# Patient Record
Sex: FEMALE | Race: WHITE | NOT HISPANIC OR LATINO | Employment: OTHER | URBAN - METROPOLITAN AREA
[De-identification: names, ages, dates, MRNs, and addresses within clinical notes are randomized per-mention and may not be internally consistent; named-entity substitution may affect disease eponyms.]

---

## 2021-01-28 ENCOUNTER — HOSPITAL ENCOUNTER (OUTPATIENT)
Dept: OTHER | Facility: HOSPITAL | Age: 86
Discharge: HOME | End: 2021-01-28
Attending: INTERNAL MEDICINE

## 2021-01-28 LAB
ALBUMIN SERPL-MCNC: 3 G/DL (ref 3.5–5)
ALBUMIN/GLOB SERPL: 1.2 {RATIO} (ref 1.4–2.6)
ALP SERPL-CCNC: 117 U/L (ref 43–160)
ALT SERPL-CCNC: 18 U/L (ref 10–40)
ANION GAP SERPL CALC-SCNC: 14 MMOL/L (ref 8–19)
AST SERPL-CCNC: 36 U/L (ref 15–50)
BILIRUB SERPL-MCNC: 0.34 MG/DL (ref 0.2–1.3)
BUN SERPL-MCNC: 57 MG/DL (ref 5–25)
BUN/CREAT SERPL: 33 {RATIO} (ref 6–20)
CALCIUM SERPL-MCNC: 9.2 MG/DL (ref 8.7–10.4)
CHLORIDE SERPL-SCNC: 115 MMOL/L (ref 99–111)
CONV CO2: 27 MMOL/L (ref 22–32)
CONV TOTAL PROTEIN: 5.6 G/DL (ref 6.3–8.2)
CREAT UR-MCNC: 1.72 MG/DL (ref 0.5–0.9)
GFR SERPLBLD BASED ON 1.73 SQ M-ARVRAT: 27 ML/MIN/{1.73_M2}
GLOBULIN UR ELPH-MCNC: 2.6 G/DL (ref 2–3.5)
GLUCOSE SERPL-MCNC: 115 MG/DL (ref 65–99)
OSMOLALITY SERPL CALC.SUM OF ELEC: 333 MOSM/KG (ref 273–304)
POTASSIUM SERPL-SCNC: 3.3 MMOL/L (ref 3.5–5.3)
SODIUM SERPL-SCNC: 153 MMOL/L (ref 135–147)

## 2021-02-03 ENCOUNTER — HOSPITAL ENCOUNTER (OUTPATIENT)
Dept: OTHER | Facility: HOSPITAL | Age: 86
Discharge: HOME | End: 2021-02-03
Attending: INTERNAL MEDICINE

## 2021-02-03 LAB
APPEARANCE UR: ABNORMAL
BILIRUB UR QL: NEGATIVE
COLOR UR: YELLOW
CONV BACTERIA: ABNORMAL
CONV COLLECTION SOURCE (UA): ABNORMAL
CONV UROBILINOGEN IN URINE BY AUTOMATED TEST STRIP: 0.2 {EHRLICHU}/DL (ref 0.1–1)
CONV YEAST, UA: PRESENT
GLUCOSE UR QL: NEGATIVE MG/DL
HGB UR QL STRIP: ABNORMAL
KETONES UR QL STRIP: NEGATIVE MG/DL
LEUKOCYTE ESTERASE UR QL STRIP: ABNORMAL
NITRITE UR QL STRIP: NEGATIVE
PH UR STRIP.AUTO: 5 [PH] (ref 5–8)
PROT UR QL: NEGATIVE MG/DL
RBC #/AREA URNS HPF: ABNORMAL /[HPF]
SP GR UR: 1.01 (ref 1–1.03)
SQUAMOUS SPT QL MICRO: ABNORMAL /[HPF]
WBC #/AREA URNS HPF: ABNORMAL /[HPF]

## 2021-02-23 ENCOUNTER — HOSPITAL ENCOUNTER (OUTPATIENT)
Dept: VACCINE CLINIC | Facility: HOSPITAL | Age: 86
Discharge: HOME | End: 2021-02-23
Attending: INTERNAL MEDICINE

## 2021-02-25 ENCOUNTER — HOSPITAL ENCOUNTER (OUTPATIENT)
Dept: OTHER | Facility: HOSPITAL | Age: 86
Discharge: HOME | End: 2021-02-25
Attending: INTERNAL MEDICINE

## 2021-02-25 LAB
APPEARANCE UR: CLEAR
BILIRUB UR QL: NEGATIVE
COLOR UR: YELLOW
CONV COLLECTION SOURCE (UA): NORMAL
CONV UROBILINOGEN IN URINE BY AUTOMATED TEST STRIP: 1 {EHRLICHU}/DL (ref 0.1–1)
GLUCOSE UR QL: NEGATIVE MG/DL
HGB UR QL STRIP: NEGATIVE
KETONES UR QL STRIP: NEGATIVE MG/DL
LEUKOCYTE ESTERASE UR QL STRIP: NEGATIVE
NITRITE UR QL STRIP: NEGATIVE
PH UR STRIP.AUTO: 7 [PH] (ref 5–8)
PROT UR QL: NEGATIVE MG/DL
SP GR UR: 1.01 (ref 1–1.03)

## 2021-02-27 LAB
AMPICILLIN SUSC ISLT: <=2
BACTERIA UR CULT: ABNORMAL
CIPROFLOXACIN SUSC ISLT: >=8
CONV GENTAMICIN HIGH LEVEL SYNERGY: ABNORMAL
CONV STREPTOMYCIN HIGH LEVEL SYNERGY: ABNORMAL
DAPTOMYCIN SUSC ISLT: 2
DOXYCYCLINE SUSC ISLT: 8
ERYTHROMYCIN SUSC ISLT: 4
LEVOFLOXACIN SUSC ISLT: >=8
LINEZOLID SUSC ISLT: 2
NITROFURANTOIN SUSC ISLT: <=16
TETRACYCLINE SUSC ISLT: >=16
VANCOMYCIN SUSC ISLT: 1

## 2021-03-26 ENCOUNTER — HOSPITAL ENCOUNTER (OUTPATIENT)
Dept: VACCINE CLINIC | Facility: HOSPITAL | Age: 86
Discharge: HOME | End: 2021-03-26
Attending: INTERNAL MEDICINE

## 2021-06-06 ENCOUNTER — APPOINTMENT (OUTPATIENT)
Dept: CT IMAGING | Facility: HOSPITAL | Age: 86
End: 2021-06-06

## 2021-06-06 ENCOUNTER — APPOINTMENT (OUTPATIENT)
Dept: GENERAL RADIOLOGY | Facility: HOSPITAL | Age: 86
End: 2021-06-06

## 2021-06-06 ENCOUNTER — HOSPITAL ENCOUNTER (INPATIENT)
Facility: HOSPITAL | Age: 86
LOS: 12 days | Discharge: NURSING, REHAB OR LONG-TERM CARE | End: 2021-06-18
Attending: EMERGENCY MEDICINE | Admitting: HOSPITALIST

## 2021-06-06 DIAGNOSIS — J18.9 PNEUMONIA DUE TO INFECTIOUS ORGANISM, UNSPECIFIED LATERALITY, UNSPECIFIED PART OF LUNG: ICD-10-CM

## 2021-06-06 DIAGNOSIS — R91.8 LUNG MASS: ICD-10-CM

## 2021-06-06 DIAGNOSIS — R13.12 OROPHARYNGEAL DYSPHAGIA: ICD-10-CM

## 2021-06-06 DIAGNOSIS — Z78.9 DECREASED ACTIVITIES OF DAILY LIVING (ADL): ICD-10-CM

## 2021-06-06 DIAGNOSIS — Z91.89: Primary | ICD-10-CM

## 2021-06-06 DIAGNOSIS — R26.2 DIFFICULTY WALKING: ICD-10-CM

## 2021-06-06 DIAGNOSIS — E43 SEVERE MALNUTRITION (HCC): ICD-10-CM

## 2021-06-06 DIAGNOSIS — R09.02 HYPOXIA: ICD-10-CM

## 2021-06-06 PROBLEM — R06.02 SOB (SHORTNESS OF BREATH): Status: ACTIVE | Noted: 2021-06-06

## 2021-06-06 PROBLEM — I10 HTN (HYPERTENSION): Status: ACTIVE | Noted: 2021-06-06

## 2021-06-06 PROBLEM — Z86.711 HISTORY OF PULMONARY EMBOLISM: Status: ACTIVE | Noted: 2021-06-06

## 2021-06-06 LAB
ALBUMIN SERPL-MCNC: 3.9 G/DL (ref 3.5–5.2)
ALBUMIN/GLOB SERPL: 1.4 G/DL
ALP SERPL-CCNC: 73 U/L (ref 39–117)
ALT SERPL W P-5'-P-CCNC: 10 U/L (ref 1–33)
ANION GAP SERPL CALCULATED.3IONS-SCNC: 10.7 MMOL/L (ref 5–15)
AST SERPL-CCNC: 24 U/L (ref 1–32)
BASOPHILS # BLD AUTO: 0.07 10*3/MM3 (ref 0–0.2)
BASOPHILS NFR BLD AUTO: 1.1 % (ref 0–1.5)
BILIRUB SERPL-MCNC: 0.3 MG/DL (ref 0–1.2)
BUN SERPL-MCNC: 31 MG/DL (ref 8–23)
BUN/CREAT SERPL: 30.4 (ref 7–25)
CALCIUM SPEC-SCNC: 9.4 MG/DL (ref 8.6–10.5)
CHLORIDE SERPL-SCNC: 111 MMOL/L (ref 98–107)
CO2 SERPL-SCNC: 23.3 MMOL/L (ref 22–29)
CREAT SERPL-MCNC: 1.02 MG/DL (ref 0.57–1)
D-LACTATE SERPL-SCNC: 1.3 MMOL/L (ref 0.5–2)
DEPRECATED RDW RBC AUTO: 49 FL (ref 37–54)
EOSINOPHIL # BLD AUTO: 0.1 10*3/MM3 (ref 0–0.4)
EOSINOPHIL NFR BLD AUTO: 1.6 % (ref 0.3–6.2)
ERYTHROCYTE [DISTWIDTH] IN BLOOD BY AUTOMATED COUNT: 14.6 % (ref 12.3–15.4)
GFR SERPL CREATININE-BSD FRML MDRD: 52 ML/MIN/1.73
GLOBULIN UR ELPH-MCNC: 2.7 GM/DL
GLUCOSE SERPL-MCNC: 94 MG/DL (ref 65–99)
HCT VFR BLD AUTO: 36.2 % (ref 34–46.6)
HGB BLD-MCNC: 11 G/DL (ref 12–15.9)
HOLD SPECIMEN: NORMAL
HOLD SPECIMEN: NORMAL
IMM GRANULOCYTES # BLD AUTO: 0.01 10*3/MM3 (ref 0–0.05)
IMM GRANULOCYTES NFR BLD AUTO: 0.2 % (ref 0–0.5)
LYMPHOCYTES # BLD AUTO: 2.68 10*3/MM3 (ref 0.7–3.1)
LYMPHOCYTES NFR BLD AUTO: 41.7 % (ref 19.6–45.3)
MCH RBC QN AUTO: 27.8 PG (ref 26.6–33)
MCHC RBC AUTO-ENTMCNC: 30.4 G/DL (ref 31.5–35.7)
MCV RBC AUTO: 91.6 FL (ref 79–97)
MONOCYTES # BLD AUTO: 0.43 10*3/MM3 (ref 0.1–0.9)
MONOCYTES NFR BLD AUTO: 6.7 % (ref 5–12)
NEUTROPHILS NFR BLD AUTO: 3.13 10*3/MM3 (ref 1.7–7)
NEUTROPHILS NFR BLD AUTO: 48.7 % (ref 42.7–76)
NRBC BLD AUTO-RTO: 0 /100 WBC (ref 0–0.2)
NT-PROBNP SERPL-MCNC: 278.6 PG/ML (ref 0–1800)
PLATELET # BLD AUTO: 286 10*3/MM3 (ref 140–450)
PMV BLD AUTO: 9.8 FL (ref 6–12)
POTASSIUM SERPL-SCNC: 4.3 MMOL/L (ref 3.5–5.2)
PROT SERPL-MCNC: 6.6 G/DL (ref 6–8.5)
RBC # BLD AUTO: 3.95 10*6/MM3 (ref 3.77–5.28)
SODIUM SERPL-SCNC: 145 MMOL/L (ref 136–145)
TROPONIN T SERPL-MCNC: <0.01 NG/ML (ref 0–0.03)
WBC # BLD AUTO: 6.42 10*3/MM3 (ref 3.4–10.8)
WHOLE BLOOD HOLD SPECIMEN: NORMAL
WHOLE BLOOD HOLD SPECIMEN: NORMAL

## 2021-06-06 PROCEDURE — 82375 ASSAY CARBOXYHB QUANT: CPT | Performed by: EMERGENCY MEDICINE

## 2021-06-06 PROCEDURE — 99223 1ST HOSP IP/OBS HIGH 75: CPT | Performed by: HOSPITALIST

## 2021-06-06 PROCEDURE — 83050 HGB METHEMOGLOBIN QUAN: CPT | Performed by: EMERGENCY MEDICINE

## 2021-06-06 PROCEDURE — 84484 ASSAY OF TROPONIN QUANT: CPT | Performed by: EMERGENCY MEDICINE

## 2021-06-06 PROCEDURE — 83880 ASSAY OF NATRIURETIC PEPTIDE: CPT | Performed by: EMERGENCY MEDICINE

## 2021-06-06 PROCEDURE — 71045 X-RAY EXAM CHEST 1 VIEW: CPT

## 2021-06-06 PROCEDURE — 83605 ASSAY OF LACTIC ACID: CPT | Performed by: EMERGENCY MEDICINE

## 2021-06-06 PROCEDURE — 87040 BLOOD CULTURE FOR BACTERIA: CPT | Performed by: EMERGENCY MEDICINE

## 2021-06-06 PROCEDURE — 85025 COMPLETE CBC W/AUTO DIFF WBC: CPT | Performed by: EMERGENCY MEDICINE

## 2021-06-06 PROCEDURE — 80053 COMPREHEN METABOLIC PANEL: CPT | Performed by: EMERGENCY MEDICINE

## 2021-06-06 PROCEDURE — 71260 CT THORAX DX C+: CPT

## 2021-06-06 PROCEDURE — 25010000002 PIPERACILLIN SOD-TAZOBACTAM PER 1 G: Performed by: EMERGENCY MEDICINE

## 2021-06-06 PROCEDURE — 93005 ELECTROCARDIOGRAM TRACING: CPT | Performed by: EMERGENCY MEDICINE

## 2021-06-06 PROCEDURE — 0 IOPAMIDOL PER 1 ML: Performed by: EMERGENCY MEDICINE

## 2021-06-06 PROCEDURE — 36600 WITHDRAWAL OF ARTERIAL BLOOD: CPT | Performed by: EMERGENCY MEDICINE

## 2021-06-06 PROCEDURE — 82805 BLOOD GASES W/O2 SATURATION: CPT | Performed by: EMERGENCY MEDICINE

## 2021-06-06 PROCEDURE — 99285 EMERGENCY DEPT VISIT HI MDM: CPT

## 2021-06-06 RX ORDER — ACETAMINOPHEN 500 MG
1000 TABLET ORAL 3 TIMES DAILY
Status: DISPENSED | OUTPATIENT
Start: 2021-06-07 | End: 2021-06-08

## 2021-06-06 RX ORDER — POLYETHYLENE GLYCOL 3350 17 G/17G
17 POWDER, FOR SOLUTION ORAL DAILY PRN
Status: DISCONTINUED | OUTPATIENT
Start: 2021-06-06 | End: 2021-06-18 | Stop reason: HOSPADM

## 2021-06-06 RX ORDER — LOSARTAN POTASSIUM 100 MG/1
TABLET ORAL EVERY 24 HOURS
COMMUNITY

## 2021-06-06 RX ORDER — AMOXICILLIN 250 MG
2 CAPSULE ORAL 2 TIMES DAILY
Status: DISCONTINUED | OUTPATIENT
Start: 2021-06-07 | End: 2021-06-18 | Stop reason: HOSPADM

## 2021-06-06 RX ORDER — PREDNISONE 20 MG/1
40 TABLET ORAL
Status: COMPLETED | OUTPATIENT
Start: 2021-06-07 | End: 2021-06-11

## 2021-06-06 RX ORDER — GUAIFENESIN 600 MG/1
600 TABLET, EXTENDED RELEASE ORAL EVERY 12 HOURS SCHEDULED
Status: DISCONTINUED | OUTPATIENT
Start: 2021-06-07 | End: 2021-06-18 | Stop reason: HOSPADM

## 2021-06-06 RX ORDER — OXYCODONE HYDROCHLORIDE 5 MG/1
5 TABLET ORAL EVERY 4 HOURS PRN
Status: ACTIVE | OUTPATIENT
Start: 2021-06-06 | End: 2021-06-13

## 2021-06-06 RX ORDER — SODIUM CHLORIDE 0.9 % (FLUSH) 0.9 %
10 SYRINGE (ML) INJECTION AS NEEDED
Status: DISCONTINUED | OUTPATIENT
Start: 2021-06-06 | End: 2021-06-06

## 2021-06-06 RX ORDER — LOSARTAN POTASSIUM 50 MG/1
100 TABLET ORAL
Status: DISCONTINUED | OUTPATIENT
Start: 2021-06-07 | End: 2021-06-18 | Stop reason: HOSPADM

## 2021-06-06 RX ORDER — LIDOCAINE 50 MG/G
1 PATCH TOPICAL
Status: DISCONTINUED | OUTPATIENT
Start: 2021-06-07 | End: 2021-06-18 | Stop reason: HOSPADM

## 2021-06-06 RX ORDER — BUDESONIDE 0.5 MG/2ML
0.5 INHALANT ORAL
Status: DISCONTINUED | OUTPATIENT
Start: 2021-06-06 | End: 2021-06-18 | Stop reason: HOSPADM

## 2021-06-06 RX ORDER — IPRATROPIUM BROMIDE AND ALBUTEROL SULFATE 2.5; .5 MG/3ML; MG/3ML
3 SOLUTION RESPIRATORY (INHALATION)
Status: DISCONTINUED | OUTPATIENT
Start: 2021-06-07 | End: 2021-06-18 | Stop reason: HOSPADM

## 2021-06-06 RX ORDER — TROLAMINE SALICYLATE 10 G/100G
1 CREAM TOPICAL 4 TIMES DAILY PRN
Status: DISCONTINUED | OUTPATIENT
Start: 2021-06-06 | End: 2021-06-06

## 2021-06-06 RX ORDER — BISACODYL 10 MG
10 SUPPOSITORY, RECTAL RECTAL DAILY PRN
Status: DISCONTINUED | OUTPATIENT
Start: 2021-06-06 | End: 2021-06-18 | Stop reason: HOSPADM

## 2021-06-06 RX ORDER — SODIUM CHLORIDE 0.9 % (FLUSH) 0.9 %
10 SYRINGE (ML) INJECTION EVERY 12 HOURS SCHEDULED
Status: DISCONTINUED | OUTPATIENT
Start: 2021-06-07 | End: 2021-06-18 | Stop reason: HOSPADM

## 2021-06-06 RX ORDER — DRONABINOL 2.5 MG/1
5 CAPSULE ORAL 2 TIMES DAILY
Status: DISPENSED | OUTPATIENT
Start: 2021-06-07 | End: 2021-06-14

## 2021-06-06 RX ORDER — MEGESTROL ACETATE 40 MG/ML
20 SUSPENSION ORAL DAILY
COMMUNITY
End: 2021-06-18 | Stop reason: HOSPADM

## 2021-06-06 RX ORDER — CHOLECALCIFEROL (VITAMIN D3) 125 MCG
5 CAPSULE ORAL NIGHTLY PRN
Status: DISCONTINUED | OUTPATIENT
Start: 2021-06-06 | End: 2021-06-18 | Stop reason: HOSPADM

## 2021-06-06 RX ORDER — NICOTINE 21 MG/24HR
1 PATCH, TRANSDERMAL 24 HOURS TRANSDERMAL EVERY 24 HOURS
Status: DISCONTINUED | OUTPATIENT
Start: 2021-06-07 | End: 2021-06-07

## 2021-06-06 RX ORDER — FAMOTIDINE 20 MG/1
40 TABLET, FILM COATED ORAL DAILY
Status: DISCONTINUED | OUTPATIENT
Start: 2021-06-07 | End: 2021-06-15

## 2021-06-06 RX ORDER — SODIUM CHLORIDE 9 MG/ML
75 INJECTION, SOLUTION INTRAVENOUS CONTINUOUS
Status: ACTIVE | OUTPATIENT
Start: 2021-06-07 | End: 2021-06-07

## 2021-06-06 RX ORDER — FUROSEMIDE 40 MG/1
20 TABLET ORAL TAKE AS DIRECTED
Status: DISCONTINUED | OUTPATIENT
Start: 2021-06-06 | End: 2021-06-06

## 2021-06-06 RX ORDER — FUROSEMIDE 20 MG/1
20 TABLET ORAL TAKE AS DIRECTED
COMMUNITY
End: 2021-06-18 | Stop reason: HOSPADM

## 2021-06-06 RX ORDER — METOPROLOL SUCCINATE 25 MG/1
25 TABLET, EXTENDED RELEASE ORAL
Status: DISCONTINUED | OUTPATIENT
Start: 2021-06-07 | End: 2021-06-18 | Stop reason: HOSPADM

## 2021-06-06 RX ORDER — BISACODYL 5 MG/1
5 TABLET, DELAYED RELEASE ORAL DAILY PRN
Status: DISCONTINUED | OUTPATIENT
Start: 2021-06-06 | End: 2021-06-18 | Stop reason: HOSPADM

## 2021-06-06 RX ORDER — SODIUM CHLORIDE 0.9 % (FLUSH) 0.9 %
10 SYRINGE (ML) INJECTION AS NEEDED
Status: DISCONTINUED | OUTPATIENT
Start: 2021-06-06 | End: 2021-06-18 | Stop reason: HOSPADM

## 2021-06-06 RX ORDER — IPRATROPIUM BROMIDE AND ALBUTEROL SULFATE 2.5; .5 MG/3ML; MG/3ML
3 SOLUTION RESPIRATORY (INHALATION)
Status: DISCONTINUED | OUTPATIENT
Start: 2021-06-07 | End: 2021-06-06

## 2021-06-06 RX ORDER — NITROGLYCERIN 0.4 MG/1
0.4 TABLET SUBLINGUAL
Status: DISCONTINUED | OUTPATIENT
Start: 2021-06-06 | End: 2021-06-18 | Stop reason: HOSPADM

## 2021-06-06 RX ORDER — METOPROLOL SUCCINATE 25 MG/1
25 TABLET, EXTENDED RELEASE ORAL 2 TIMES DAILY
COMMUNITY

## 2021-06-06 RX ORDER — MORPHINE SULFATE 2 MG/ML
2 INJECTION, SOLUTION INTRAMUSCULAR; INTRAVENOUS
Status: ACTIVE | OUTPATIENT
Start: 2021-06-06 | End: 2021-06-13

## 2021-06-06 RX ORDER — ARFORMOTEROL TARTRATE 15 UG/2ML
15 SOLUTION RESPIRATORY (INHALATION)
Status: DISCONTINUED | OUTPATIENT
Start: 2021-06-06 | End: 2021-06-18 | Stop reason: HOSPADM

## 2021-06-06 RX ADMIN — TAZOBACTAM SODIUM AND PIPERACILLIN SODIUM 3.38 G: 375; 3 INJECTION, SOLUTION INTRAVENOUS at 19:38

## 2021-06-06 RX ADMIN — IOPAMIDOL 100 ML: 755 INJECTION, SOLUTION INTRAVENOUS at 18:14

## 2021-06-06 NOTE — ED PROVIDER NOTES
Subjective   Son is at the bedside.  He reports that she has had worsening shortness of breath for the last several days.  She has had a cough.  She has had no fever or chills.  The patient denies chest pain.  Patient has no nausea, vomiting or diaphoresis.  Patient denies leg pain and swelling.  Patient has no belly pain and has had no diarrhea.  The patient reports that she has had no leg swelling.      History provided by:  Caregiver and parent   used: No        Review of Systems   Constitutional: Positive for activity change and fatigue. Negative for appetite change, chills and diaphoresis.   HENT: Positive for congestion and sore throat. Negative for hearing loss, mouth sores, nosebleeds and sinus pain.    Eyes: Negative for pain, discharge and itching.   Respiratory: Positive for shortness of breath. Negative for apnea and chest tightness.    Cardiovascular: Negative for chest pain, palpitations and leg swelling.   Gastrointestinal: Negative for abdominal distention, abdominal pain, diarrhea and nausea.   Endocrine: Negative for cold intolerance and heat intolerance.   All other systems reviewed and are negative.      Past Medical History:   Diagnosis Date   • Cancer (CMS/HCC)    • DVT (deep venous thrombosis) (CMS/[facility])    • Hypertension        Allergies   Allergen Reactions   • Influenza Vaccines Anaphylaxis   • Megestrol Itching       Past Surgical History:   Procedure Laterality Date   • COLON SURGERY     • JOINT REPLACEMENT         History reviewed. No pertinent family history.    Social History     Socioeconomic History   • Marital status:      Spouse name: Not on file   • Number of children: Not on file   • Years of education: Not on file   • Highest education level: Not on file   Tobacco Use   • Smoking status: Current Every Day Smoker     Packs/day: 0.50   Vaping Use   • Vaping Use: Never used   Substance and Sexual Activity   • Alcohol use: Not Currently   • Drug use: Never    • Sexual activity: Not Currently           Objective   Physical Exam  Constitutional:       Appearance: She is well-developed and normal weight.   HENT:      Head: Normocephalic and atraumatic.   Eyes:      Extraocular Movements: Extraocular movements intact.      Pupils: Pupils are equal, round, and reactive to light.   Cardiovascular:      Rate and Rhythm: Normal rate.   Pulmonary:      Effort: Pulmonary effort is normal.      Breath sounds: Normal breath sounds.   Chest:      Chest wall: No mass, tenderness or edema. There is no dullness to percussion.   Abdominal:      General: Bowel sounds are normal.      Palpations: Abdomen is soft.   Musculoskeletal:         General: Normal range of motion.      Cervical back: Normal range of motion and neck supple.   Skin:     General: Skin is dry.      Capillary Refill: Capillary refill takes less than 2 seconds.   Neurological:      General: No focal deficit present.      Mental Status: She is alert.   Psychiatric:         Mood and Affect: Mood normal.         ECG 12 Lead      Date/Time: 6/6/2021 4:25 PM  Performed by: Jacob Galarza MD  Authorized by: Jacob Galarza MD   Interpreted by physician  Comparison: compared with previous ECG   Rhythm: sinus rhythm  Rate: normal  BPM: 65  QRS axis: normal  Conduction: conduction normal  ST Segments: ST segments normal  T Waves: T waves normal  Other: no other findings  Clinical impression: normal ECG                 ED Course           CT Chest With Contrast Diagnostic    Result Date: 6/6/2021  Narrative: PROCEDURE: CT CHEST W CONTRAST DIAGNOSTIC  COMPARISON:  [facility], CR, CXR PORTABLE, 1/13/2021, 18:31.  [facility], CR, CHEST AP/PA 1 VIEW, 1/12/2021, 18:45.  [facility], CT, CHEST W/ CONTRAST, 1/12/2007, 12:07.  [facility], CR, XR CHEST 1 VW, 6/06/2021, 14:44.  [facility], CT, ABDOMEN/PELVIS WITH CONTRAST, 7/31/2018, 8:46. INDICATIONS: shortness  of breath  PROTOCOL:   Pulmonary embolism imaging protocol performed    RADIATION:   DLP: 183.7 mGy*cm   Automated exposure control was utilized to minimize radiation dose. CONTRAST: 75 cc Isovue 370 I.V. LABS:   eGFR: >60 ml/min/1.73m2  TECHNIQUE: Axial images of the chest with intravenous contrast.  FINDINGS: There is a 2.3 cm spiculated nodule in the posterior superior right upper lobe.  No pulmonary emboli are identified.  Cardiomegaly is present.  There is coronary artery calcification.  There is a tiny right pleural effusion.  The thoracic aorta has a normal caliber.  Endobronchial debris/secretion is present in the left lower lobe and lingula.  There is mild airspace consolidation in the lower lobes.  There is no mediastinal, axillary or hilar adenopathy.  Stable 2.8 cm abdominal aortic aneurysm.  The bones are osteopenic.  There are multiple age-indeterminate compression fractures in the thoracic spine.  Emphysema is present in the lung fields.  IMPRESSION:  1. 2.3 cm spiculated nodule in the posterior superior right upper lobe suspicious for malignancy.  2. No pulmonary emboli are identified.  3. Endobronchial secretions/debris in the lingula and left lower lobe.  4. Airspace consolidation in the lower lobes is likely atelectasis.  Pneumonia is also possible.  5. Stable 2.8 cm abdominal aortic aneurysm.  6. Tiny right pleural effusion.   DENVER PAREDES MD       Electronically Signed and Approved By: DENVER PAREDES MD on 6/06/2021 at 18:27             XR Chest 1 View    Result Date: 6/6/2021  Narrative: PROCEDURE: XR CHEST 1 VW  COMPARISON: [facility], DEE DEE, CHEST AP/PA 1 VIEW, 12/14/2016, 23:54.  [facility], DEE DEE, CHEST AP/PA 1 VIEW, 9/07/2018, 18:20.  [facility], DEE DEE, CHEST AP/PA 1 VIEW, 10/21/2019, 7:58.  [facility], DEE DEE, CHEST AP/PA 1 VIEW, 1/12/2021, 18:45.  [facility], DEE DEE, CXR PORTABLE, 1/13/2021, 18:31.  INDICATIONS: SOB X 3 DAYS   FINDINGS:   The lungs are well-expanded. The heart and pulmonary vasculature are within normal limits. No pleural effusions are identified. There is a 1.8 cm opacity in the right apex.  Emphysematous changes are present.  The heart and pulmonary vasculature are within normal limits.  IMPRESSION:  1.8 cm opacity in the right apex possibly secondary to artifact.  Suggest a CT scan of the chest to evaluate for a pulmonary nodule given the smoking history.   DENVER PAREDES MD       Electronically Signed and Approved By: DENVER PAREDES MD on 6/06/2021 at 15:01                                             MDM  Number of Diagnoses or Management Options  Hypoxia  Lung mass  Pneumonia due to infectious organism, unspecified laterality, unspecified part of lung  Diagnosis management comments: The patient´s CBC was reviewed and shows no abnormalities of critical concern. Of note, there is no anemia requiring a blood transfusion and the platelet count is acceptable.  The patient´s CMP was reviewed and shows no abnormalities of critical concern. Of note, the patient´s sodium and potassium are acceptable. The patient´s liver enzymes are unremarkable. The patient´s renal function (creatinine) is preserved. The patient has a normal anion gap.  CT scan shows a spiculated nodule consistent consistent with malignancy.patient does have consolidations with possible pneumonia.  Patient was given Zosyn in the emergency department.  Patient also placed on a nonrebreather for hypoxia.  Case discussed with hospitalist who agrees with admission.    Total Critical Care time of 35 minutes. Total critical care time documented does not include time spent on separately billed procedures for services of nurses or physician assistants. I personally saw and examined the patient. I have reviewed all diagnostic interpretations and treatment plans as written. I was present for the key portions of any procedures performed and the inclusive time noted  in any critical care statement. Critical care time includes patient management by me, time spent at the patients bedside,  time to review lab and imaging results, discussing patient care, documentation in the medical record, and time spent with family or caregiver.       Amount and/or Complexity of Data Reviewed  Clinical lab tests: ordered and reviewed  Tests in the radiology section of CPT®: ordered and reviewed  Tests in the medicine section of CPT®: ordered and reviewed  Decide to obtain previous medical records or to obtain history from someone other than the patient: yes  Review and summarize past medical records: yes  Independent visualization of images, tracings, or specimens: yes    Risk of Complications, Morbidity, and/or Mortality  Presenting problems: high  Diagnostic procedures: high  Management options: high    Critical Care  Total time providing critical care: 30-74 minutes    Patient Progress  Patient progress: improved      Final diagnoses:   Pneumonia due to infectious organism, unspecified laterality, unspecified part of lung   Lung mass   Hypoxia       ED Disposition  ED Disposition     ED Disposition Condition Comment    Decision to Admit  Level of Care: Telemetry [5]   Diagnosis: Pneumonia due to infectious organism, unspecified laterality, unspecified part of lung [[id number]]   Admitting Physician: MAGDY BECK [[id number]]   Certification: I Certify That Inpatient Hospital Services Are Medically Necessary For Greater Than 2 Midnights            No follow-up provider specified.       Medication List      No changes were made to your prescriptions during this visit.          Jacob Galaraz MD  06/06/21 2021

## 2021-06-07 PROBLEM — J44.1 COPD WITH ACUTE EXACERBATION (HCC): Status: ACTIVE | Noted: 2021-06-07

## 2021-06-07 PROBLEM — R09.02 COPD WITH HYPOXIA (HCC): Status: ACTIVE | Noted: 2021-06-07

## 2021-06-07 PROBLEM — Z91.89: Status: ACTIVE | Noted: 2021-06-07

## 2021-06-07 PROBLEM — R06.02 SOB (SHORTNESS OF BREATH): Status: RESOLVED | Noted: 2021-06-06 | Resolved: 2021-06-07

## 2021-06-07 PROBLEM — Z86.711 HISTORY OF PULMONARY EMBOLISM: Status: RESOLVED | Noted: 2021-06-06 | Resolved: 2021-06-07

## 2021-06-07 PROBLEM — F17.200 NICOTINE DEPENDENCE: Status: ACTIVE | Noted: 2021-06-07

## 2021-06-07 PROBLEM — R89.9 ABNORMAL PLEURAL FLUID: Status: ACTIVE | Noted: 2021-06-07

## 2021-06-07 PROBLEM — J44.9 COPD WITH HYPOXIA (HCC): Status: ACTIVE | Noted: 2021-06-07

## 2021-06-07 PROBLEM — R91.8 MASS OF UPPER LOBE OF RIGHT LUNG: Status: ACTIVE | Noted: 2021-06-07

## 2021-06-07 LAB
ANION GAP SERPL CALCULATED.3IONS-SCNC: 10.6 MMOL/L (ref 5–15)
BASOPHILS # BLD AUTO: 0.06 10*3/MM3 (ref 0–0.2)
BASOPHILS NFR BLD AUTO: 0.9 % (ref 0–1.5)
BUN SERPL-MCNC: 27 MG/DL (ref 8–23)
BUN/CREAT SERPL: 27.3 (ref 7–25)
CALCIUM SPEC-SCNC: 9.1 MG/DL (ref 8.6–10.5)
CHLORIDE SERPL-SCNC: 110 MMOL/L (ref 98–107)
CO2 SERPL-SCNC: 19.4 MMOL/L (ref 22–29)
CREAT SERPL-MCNC: 0.99 MG/DL (ref 0.57–1)
DEPRECATED RDW RBC AUTO: 47.7 FL (ref 37–54)
EOSINOPHIL # BLD AUTO: 0.08 10*3/MM3 (ref 0–0.4)
EOSINOPHIL NFR BLD AUTO: 1.2 % (ref 0.3–6.2)
ERYTHROCYTE [DISTWIDTH] IN BLOOD BY AUTOMATED COUNT: 14.4 % (ref 12.3–15.4)
FERRITIN SERPL-MCNC: 15.78 NG/ML (ref 13–150)
FOLATE SERPL-MCNC: 19 NG/ML (ref 4.78–24.2)
GFR SERPL CREATININE-BSD FRML MDRD: 53 ML/MIN/1.73
GLUCOSE BLDC GLUCOMTR-MCNC: 98 MG/DL (ref 70–130)
GLUCOSE SERPL-MCNC: 81 MG/DL (ref 65–99)
HBA1C MFR BLD: 5.1 % (ref 4.8–5.6)
HCT VFR BLD AUTO: 33.4 % (ref 34–46.6)
HGB BLD-MCNC: 10 G/DL (ref 12–15.9)
IMM GRANULOCYTES # BLD AUTO: 0.01 10*3/MM3 (ref 0–0.05)
IMM GRANULOCYTES NFR BLD AUTO: 0.2 % (ref 0–0.5)
IRON 24H UR-MRATE: 33 MCG/DL (ref 37–145)
IRON SATN MFR SERPL: 10 % (ref 20–50)
LYMPHOCYTES # BLD AUTO: 2.09 10*3/MM3 (ref 0.7–3.1)
LYMPHOCYTES NFR BLD AUTO: 32.1 % (ref 19.6–45.3)
MAGNESIUM SERPL-MCNC: 1.9 MG/DL (ref 1.6–2.4)
MCH RBC QN AUTO: 27.1 PG (ref 26.6–33)
MCHC RBC AUTO-ENTMCNC: 29.9 G/DL (ref 31.5–35.7)
MCV RBC AUTO: 90.5 FL (ref 79–97)
MONOCYTES # BLD AUTO: 0.39 10*3/MM3 (ref 0.1–0.9)
MONOCYTES NFR BLD AUTO: 6 % (ref 5–12)
NEUTROPHILS NFR BLD AUTO: 3.89 10*3/MM3 (ref 1.7–7)
NEUTROPHILS NFR BLD AUTO: 59.6 % (ref 42.7–76)
NRBC BLD AUTO-RTO: 0 /100 WBC (ref 0–0.2)
PHOSPHATE SERPL-MCNC: 3.5 MG/DL (ref 2.5–4.5)
PLATELET # BLD AUTO: 223 10*3/MM3 (ref 140–450)
PMV BLD AUTO: 9.7 FL (ref 6–12)
POTASSIUM SERPL-SCNC: 3.7 MMOL/L (ref 3.5–5.2)
QT INTERVAL: 393 MS
RBC # BLD AUTO: 3.69 10*6/MM3 (ref 3.77–5.28)
RETICS # AUTO: 0.03 10*6/MM3 (ref 0.02–0.13)
RETICS/RBC NFR AUTO: 0.74 % (ref 0.7–1.9)
SODIUM SERPL-SCNC: 140 MMOL/L (ref 136–145)
T4 FREE SERPL-MCNC: 1.22 NG/DL (ref 0.93–1.7)
TIBC SERPL-MCNC: 347 MCG/DL (ref 298–536)
TRANSFERRIN SERPL-MCNC: 233 MG/DL (ref 200–360)
TSH SERPL DL<=0.05 MIU/L-ACNC: 1 UIU/ML (ref 0.27–4.2)
VIT B12 BLD-MCNC: 1687 PG/ML (ref 211–946)
WBC # BLD AUTO: 6.52 10*3/MM3 (ref 3.4–10.8)

## 2021-06-07 PROCEDURE — 83036 HEMOGLOBIN GLYCOSYLATED A1C: CPT | Performed by: HOSPITALIST

## 2021-06-07 PROCEDURE — 83735 ASSAY OF MAGNESIUM: CPT | Performed by: HOSPITALIST

## 2021-06-07 PROCEDURE — 85045 AUTOMATED RETICULOCYTE COUNT: CPT | Performed by: HOSPITALIST

## 2021-06-07 PROCEDURE — 94668 MNPJ CHEST WALL SBSQ: CPT

## 2021-06-07 PROCEDURE — 94660 CPAP INITIATION&MGMT: CPT

## 2021-06-07 PROCEDURE — 85025 COMPLETE CBC W/AUTO DIFF WBC: CPT | Performed by: HOSPITALIST

## 2021-06-07 PROCEDURE — 94799 UNLISTED PULMONARY SVC/PX: CPT

## 2021-06-07 PROCEDURE — 94760 N-INVAS EAR/PLS OXIMETRY 1: CPT

## 2021-06-07 PROCEDURE — 63710000001 PREDNISONE PER 1 MG: Performed by: HOSPITALIST

## 2021-06-07 PROCEDURE — 82746 ASSAY OF FOLIC ACID SERUM: CPT | Performed by: HOSPITALIST

## 2021-06-07 PROCEDURE — 84100 ASSAY OF PHOSPHORUS: CPT | Performed by: HOSPITALIST

## 2021-06-07 PROCEDURE — 63710000001 DRONABINOL PER 2.5 MG: Performed by: HOSPITALIST

## 2021-06-07 PROCEDURE — 84443 ASSAY THYROID STIM HORMONE: CPT | Performed by: HOSPITALIST

## 2021-06-07 PROCEDURE — 84466 ASSAY OF TRANSFERRIN: CPT | Performed by: HOSPITALIST

## 2021-06-07 PROCEDURE — 84439 ASSAY OF FREE THYROXINE: CPT | Performed by: HOSPITALIST

## 2021-06-07 PROCEDURE — 80048 BASIC METABOLIC PNL TOTAL CA: CPT | Performed by: HOSPITALIST

## 2021-06-07 PROCEDURE — 92610 EVALUATE SWALLOWING FUNCTION: CPT

## 2021-06-07 PROCEDURE — 94667 MNPJ CHEST WALL 1ST: CPT

## 2021-06-07 PROCEDURE — 94640 AIRWAY INHALATION TREATMENT: CPT

## 2021-06-07 PROCEDURE — 82728 ASSAY OF FERRITIN: CPT | Performed by: HOSPITALIST

## 2021-06-07 PROCEDURE — 82607 VITAMIN B-12: CPT | Performed by: HOSPITALIST

## 2021-06-07 PROCEDURE — 83540 ASSAY OF IRON: CPT | Performed by: HOSPITALIST

## 2021-06-07 PROCEDURE — 25010000002 CEFTRIAXONE PER 250 MG: Performed by: HOSPITALIST

## 2021-06-07 PROCEDURE — 82962 GLUCOSE BLOOD TEST: CPT

## 2021-06-07 RX ORDER — NICOTINE 21 MG/24HR
1 PATCH, TRANSDERMAL 24 HOURS TRANSDERMAL
Status: DISCONTINUED | OUTPATIENT
Start: 2021-06-07 | End: 2021-06-18 | Stop reason: HOSPADM

## 2021-06-07 RX ORDER — SODIUM CHLORIDE FOR INHALATION 3 %
VIAL, NEBULIZER (ML) INHALATION
Status: COMPLETED
Start: 2021-06-07 | End: 2021-06-07

## 2021-06-07 RX ADMIN — METOPROLOL SUCCINATE 25 MG: 25 TABLET, EXTENDED RELEASE ORAL at 08:36

## 2021-06-07 RX ADMIN — APIXABAN 2.5 MG: 2.5 TABLET, FILM COATED ORAL at 20:11

## 2021-06-07 RX ADMIN — DRONABINOL 5 MG: 2.5 CAPSULE ORAL at 20:11

## 2021-06-07 RX ADMIN — GUAIFENESIN 600 MG: 600 TABLET, EXTENDED RELEASE ORAL at 08:38

## 2021-06-07 RX ADMIN — GUAIFENESIN 600 MG: 600 TABLET, EXTENDED RELEASE ORAL at 20:11

## 2021-06-07 RX ADMIN — CEFTRIAXONE 1 G: 10 INJECTION, POWDER, FOR SOLUTION INTRAVENOUS at 00:41

## 2021-06-07 RX ADMIN — SODIUM CHLORIDE, PRESERVATIVE FREE 10 ML: 5 INJECTION INTRAVENOUS at 20:12

## 2021-06-07 RX ADMIN — APIXABAN 2.5 MG: 2.5 TABLET, FILM COATED ORAL at 00:40

## 2021-06-07 RX ADMIN — ACETAMINOPHEN 500 MG: 500 TABLET, FILM COATED ORAL at 00:38

## 2021-06-07 RX ADMIN — DOCUSATE SODIUM 50MG AND SENNOSIDES 8.6MG 2 TABLET: 8.6; 5 TABLET, FILM COATED ORAL at 20:11

## 2021-06-07 RX ADMIN — APIXABAN 2.5 MG: 2.5 TABLET, FILM COATED ORAL at 08:36

## 2021-06-07 RX ADMIN — ACETAMINOPHEN 1000 MG: 500 TABLET, FILM COATED ORAL at 20:11

## 2021-06-07 RX ADMIN — PREDNISONE 40 MG: 20 TABLET ORAL at 08:35

## 2021-06-07 RX ADMIN — SODIUM CHLORIDE, PRESERVATIVE FREE 10 ML: 5 INJECTION INTRAVENOUS at 00:43

## 2021-06-07 RX ADMIN — FAMOTIDINE 40 MG: 20 TABLET, FILM COATED ORAL at 08:35

## 2021-06-07 RX ADMIN — CEFTRIAXONE 1 G: 10 INJECTION, POWDER, FOR SOLUTION INTRAVENOUS at 20:11

## 2021-06-07 RX ADMIN — IPRATROPIUM BROMIDE AND ALBUTEROL SULFATE 3 ML: .5; 2.5 SOLUTION RESPIRATORY (INHALATION) at 01:40

## 2021-06-07 RX ADMIN — IPRATROPIUM BROMIDE AND ALBUTEROL SULFATE 3 ML: .5; 2.5 SOLUTION RESPIRATORY (INHALATION) at 07:46

## 2021-06-07 RX ADMIN — BUDESONIDE 0.5 MG: 0.5 INHALANT ORAL at 19:38

## 2021-06-07 RX ADMIN — LOSARTAN POTASSIUM 100 MG: 50 TABLET, FILM COATED ORAL at 08:37

## 2021-06-07 RX ADMIN — ARFORMOTEROL TARTRATE 15 MCG: 15 SOLUTION RESPIRATORY (INHALATION) at 19:38

## 2021-06-07 RX ADMIN — DOCUSATE SODIUM 50MG AND SENNOSIDES 8.6MG 2 TABLET: 8.6; 5 TABLET, FILM COATED ORAL at 08:36

## 2021-06-07 RX ADMIN — ARFORMOTEROL TARTRATE 15 MCG: 15 SOLUTION RESPIRATORY (INHALATION) at 07:45

## 2021-06-07 RX ADMIN — BUDESONIDE 0.5 MG: 0.5 INHALANT ORAL at 07:46

## 2021-06-07 RX ADMIN — SODIUM CHLORIDE, PRESERVATIVE FREE 10 ML: 5 INJECTION INTRAVENOUS at 08:42

## 2021-06-07 RX ADMIN — IPRATROPIUM BROMIDE AND ALBUTEROL SULFATE 3 ML: .5; 2.5 SOLUTION RESPIRATORY (INHALATION) at 19:37

## 2021-06-07 RX ADMIN — IPRATROPIUM BROMIDE AND ALBUTEROL SULFATE 3 ML: .5; 2.5 SOLUTION RESPIRATORY (INHALATION) at 12:05

## 2021-06-07 RX ADMIN — SODIUM CHLORIDE SOLN NEBU 3% 4 ML: 3 NEBU SOLN at 15:54

## 2021-06-07 RX ADMIN — DRONABINOL 5 MG: 2.5 CAPSULE ORAL at 08:38

## 2021-06-07 RX ADMIN — GUAIFENESIN 600 MG: 600 TABLET, EXTENDED RELEASE ORAL at 00:41

## 2021-06-07 RX ADMIN — SODIUM CHLORIDE 75 ML/HR: 9 INJECTION, SOLUTION INTRAVENOUS at 01:32

## 2021-06-07 NOTE — H&P
[facility]   HISTORY AND PHYSICAL    Patient Name: Elsa Terry  : 1935  MRN: [id number]  Primary Care Physician:  Joaquin Oliveira MD  Date of admission: 2021    Subjective  Short of breath  Subjective     Chief Complaint: Short of breath    Shortness of Breath  Associated symptoms include wheezing. Pertinent negatives include no fever.   Patient is an 85-year-old female who has had progressive shortness of breath for the last 7 days and productive cough.    Patient smokes 1 cigarette a day.  She denies any nausea, vomiting, and  fevers.    Chest CT shows: 2.3 cm spiculated nodule in the posterior superior right upper lobe suspicious for malignancy.Endobronchial secretions/debris in the lingula and left lower lobe. Pneumonia is also possible.Tiny right pleural effusion.  Stable 2.8 cm abdominal aortic aneurysm.    Patient's past medical history is significant for: Anemia for which she follows with me, COPD, hypertension, and history of multiple  PEs on oral anticoagulation.  She has a history of Colon Cancer resected in the past.  She has severe Kyphosis.      Review of Systems   Constitutional: Positive for appetite change and fatigue. Negative for activity change, chills, diaphoresis and fever.   HENT: Negative.    Eyes: Negative.    Respiratory: Positive for cough, shortness of breath and wheezing.    Cardiovascular: Negative.    Gastrointestinal: Negative.    Endocrine: Negative.    Genitourinary: Negative.    Musculoskeletal: Negative.    Skin: Negative.    Allergic/Immunologic: Negative.    Neurological: Negative.    Hematological: Negative.    Psychiatric/Behavioral: Negative.         Personal History     Past Medical History:   Diagnosis Date   • Cancer (CMS/HCC)     Colon Cancer approx    • DVT (deep venous thrombosis) (CMS/HCC)    • Elevated cholesterol    • Hypertension        Past Surgical History:   Procedure Laterality Date   • BREAST SURGERY      Lumpectomy   • COLON SURGERY      • JOINT REPLACEMENT      Right Hip ORIF approx 2010       Family History: family history is not on file. Otherwise pertinent FHx was reviewed and not pertinent to current issue.    Social History:  reports that she has been smoking cigarettes. She has been smoking about 0.25 packs per day. She has never used smokeless tobacco. She reports previous alcohol use. She reports that she does not use drugs.    Home Medications:  apixaban, furosemide, losartan, megestrol, and metoprolol succinate XL    Allergies:  Allergies   Allergen Reactions   • Influenza Vaccines Anaphylaxis   • Megestrol Itching       Objective    Objective     Vitals:   Temp:  [97.3 °F (36.3 °C)-98.6 °F (37 °C)] 97.4 °F (36.3 °C)  Heart Rate:  [60-77] 77  Resp:  [17-33] 20  BP: (121-188)/(52-81) 157/70  Flow (L/min):  [4-15] 15    Physical Exam   She is thin and Cachectic.  She has severe Kyphosis. She is alert and talking today. She is using her Oxygen NRB now.Decrease Breath sounds bilaterally.Abdomen soft,no masses. Cns.Moving all limbs. Ext.1 plus edema.    Result Review    Result Review:  I have personally reviewed the results from the time of this admission to 6/7/2021 10:35 EDT and agree with these findings:  [x]  Laboratory  []  Microbiology  [x]  Radiology  []  EKG/Telemetry   []  Cardiology/Vascular   []  Pathology  []  Old records  []  Other:  Most notable findings include: Spiculated nodule on lung CT.  Assessment/Plan   Assessment / Plan     Brief Patient Summary:  Elsa Terry is a 85 y.o. female who presents with worsening shortness of breath for 1 week.     Active Hospital Problems:  Active Hospital Problems    Diagnosis    • Pneumonia due to infectious organism    • SOB (shortness of breath)    • HTN (hypertension)    • History of pulmonary embolism      Plan:     #1 Acute hypoxic respiratory failure secondary to #2, 3, 4  -patient not on home O2.      #2 Possible left lower lobe pneumonia  -Started on Rocephin and azithromycin.   Low threshold to stop.    #3 COPD exacerbation  --DuoNeb, Brovana, and Pulmicort.  -Prednisone 40 mg for 5 days  -Mucinex.   -Aggressive pulmonary toilet    #4 2.3 cm spiculated nodule in the posterior superior right upper lobe suspicious   -Suspicious for malignancy  -Patient will need biopsy  -She is on Eliquis which will need to be held for biopsy  -Pulmonology consulted for further plan.     #5 History of recurrent PE  -On 2.5 mg of Eliquis. Adjusted for age and BMI.     #6  Severe protein calorie malnutrition and low body weight BMI 16.8  -Nutritional consult  -Dietary supplements    #7 Poor oral intake  -Start drobinol  -Steroid may also help augment her appetite  -Gentle hydration.  Will need to resume Lasix later during the hospital course.  -check thyroid studies    #8 GERD  -Continue Pepcid    #9 Hypertension  -Continue losartan and metoprolol    #10 Anemia (improved from last admission but still low)  -Iron panel, B12, folic acid.   -reports no dark stool.     #11 Patient had debris in lungs  -could be silently aspirating.Speech consulted.       DVT prophylaxis: On Eliquis adjusted for age and BMI  Medical DVT prophylaxis orders are present.    CODE STATUS:    Level Of Support Discussed With: Patient  Code Status: CPR  Medical Interventions (Level of Support Prior to Arrest): Full    Admission Status:  I believe this patient meets inaptient status.

## 2021-06-07 NOTE — CONSULTS
[facility]   Consult Note    Patient Name: Elsa Terry  : 1935  MRN: [id number]  Primary Care Physician: Joaquin Oliveira MD  Referring Physician: No ref. provider found  Date of admission: 2021    Subjective   Subjective     Reason for Consult/ Chief Complaint: Shortness of breath poor oxygen saturation poor appetite and loss of weight previous history of colon cancer history of smoking    HPI:  Elsa Terry is a 85 y.o. female .   C/O SOB, cough productive and on and off wheezing  There is weight loss cannot quantitate  There is on and off runny nose, sore throat, fever of heart burn.  There is no chest pain or hemoptysis.  There is no edema, clubbing or cyanosis.  Patient sleeps reclined and can not  take care of activities.  Watched at home by her     Occupation:  at a bank  Immunization flu known, pneumonia known,  PPD test status: Normal  Never been actively treated for tuberculosis or histoplasmosis  Does not use oxygen or CPAP at home  Intubated for Operative procedures  Pets: None  Travel history  Last PFT      Review of Systems  General: Denies any fever feels fatigue weight loss which she cannot quantitate  HEENT: Bilateral lens implants no nasal congestion no epistaxis  Respiratory system: Scoliotic spine with good air entry bilaterally with decreased sounds in the bases wheezing on and off  Cardiovascular system: Regular rhythm no chest pain or palpitations noted  Gastrointestinal system: No abdominal pain nausea vomiting or constipation at this time  Genitourinary system: Denies dysuria hesitancy  Musculoskeletal system: Generalized weakness bilaterally  Endocrine: Easily fatigues  Hematology: No bleeding bruising or swollen glands  Psychiatric: History of anxiety  Neurologic: Easily arousable and can communicate with full sentences generalized weakness bilaterally  Skin: No edema or rash noted       Personal History     Past Medical History:   Diagnosis Date    • Abnormal pleural fluid    • Colon cancer (CMS/HCC)    • COPD (chronic obstructive pulmonary disease) (CMS/HCC)    • DVT (deep venous thrombosis) (CMS/HCC)    • Elevated cholesterol    • Hypertension    • Mass of upper lobe of right lung    • Osteoporosis    • Pneumonia        Past Surgical History:   Procedure Laterality Date   • BREAST SURGERY      Lumpectomy   • CATARACT EXTRACTION     • COLON SURGERY     • HERNIA REPAIR     • HIP ARTHROPLASTY     • JOINT REPLACEMENT      Right Hip ORIF approx 2010   • TONSILLECTOMY     • TUBAL ABDOMINAL LIGATION         Family History: family history includes Cancer in her sister; Heart disease in her father and mother. Otherwise pertinent FHx was reviewed and not pertinent to current issue.    Social History:  reports that she has been smoking cigarettes. She started smoking about 67 years ago. She has been smoking about 0.25 packs per day. She has never used smokeless tobacco. She reports previous alcohol use. She reports that she does not use drugs.    Home Medications:  apixaban, furosemide, losartan, megestrol, and metoprolol succinate XL      Allergies:  Allergies   Allergen Reactions   • Influenza Vaccines Anaphylaxis   • Megestrol Itching       Objective    Objective   Vitals:  Temp:  [97.3 °F (36.3 °C)-98.6 °F (37 °C)] 97.8 °F (36.6 °C)  Heart Rate:  [60-77] 64  Resp:  [17-33] 29  BP: (121-188)/(52-81) 125/58  Flow (L/min):  [4-15] 15    Physical Exam:   Constitutional: Awake, alert and oriented   Eyes: PERRLA, sclerae anicteric, no conjunctival injection lens implants   HENT: NCAT, mucous membranes moist   Neck: Supple, no thyromegaly, no lymphadenopathy, trachea midline   Respiratory: Clear to auscultation bilaterally, nonlabored respirations    Cardiovascular: RRR, no murmurs, rubs, or gallops, palpable pedal pulses bilaterally   Gastrointestinal: Positive bowel sounds, soft, nontender, nondistended   Musculoskeletal: No bilateral ankle edema, no clubbing or  cyanosis to extremities   Psychiatric: Appropriate affect, cooperative   Neurologic: Oriented x 3, strength symmetric in all extremities, Cranial Nerves grossly intact to confrontation, speech clear   Skin: No rashes         Result Review    Result Review:  I have personally reviewed the results from the time of this admission to 06/07/21 12:34 PM EDT and agree with these findings:  [x]  Laboratory  []  Microbiology  [x]  Radiology  []  EKG/Telemetry   []  Cardiology/Vascular   []  Pathology  [x]  Old records  []  Other:  Most notable findings include: 6/6/2021 pH 7.39, PCO2 37 mmHg PaO2 86 mmHg 100% nonrebreathing mask  Chest x-ray 6/6/2021 shows 1.8 cm opacity in the right apex  6/6/2021 CT chest shows 2.3 cm spiculated mass in the right upper lobe with enlarged heart with left lower lobe and right lower lobe atelectasis versus airspace disease and small right pleural effusion of unknown significance    Assessment/Plan   Assessment / Plan     Brief Patient Summary:  Elsa Terry is a 85 y.o. female who admitted on 6 6 for evaluation of right upper lobe spiculated mass and a COPD exacerbation    Active Hospital Problems:  Active Hospital Problems    Diagnosis    • **COPD with hypoxia (CMS/HCC)    • Mass of upper lobe of right lung    • Abnormal pleural fluid      Small right pleural fluid     • Nicotine dependence    • Impaired attitude towards nutritional status    • Pneumonia due to infectious organism    • HTN (hypertension)        Plan:   Active Hospital Problems    Diagnosis    • **COPD with hypoxia (CMS/HCC): Continue duo nebs Brovana and Pulmicort  Continue oxygen supplement 2 to 4 L to maintain saturation greater than 90%  Started on BiPAP 15/5 rate of 14 FiO2 50%  Titrate FiO2 to lowest to maintain saturation greater than 90%    • Mass of upper lobe of right lung: We will consider diagnostic procedure when stable      • Abnormal pleural fluid: Scanty small right pleural fluid will tap when symptomatic  currently no active intervention at this time        Small right pleural fluid     • Nicotine dependence: Continue Habitrol 21 mg patch daily    • Impaired attitude towards nutritional status: Encourage nutrition continue Marinol orally    • Pneumonia due to infectious organism: Sputum for Gram stain cultures and sensitivity  Continue with IV Zosyn empirically pending above test result    • HTN (hypertension): Continue losartan      Thanks for the consult will follow patient's progress and care along with laure Oliveira MD

## 2021-06-07 NOTE — H&P
[facility]   HISTORY AND PHYSICAL    Patient Name: Elsa Terry  : 1935  MRN: [id number]  Primary Care Physician:  Joaquin Oliveira MD  Date of admission: 2021    Subjective  Short of breath  Subjective     Chief Complaint: Short of breath    History of Present Illness patient is an 85-year-old female who has had progressive shortness of breath for the last 7 days and productive cough.    Patient smokes 1 cigarette a day.  She denies any nausea, vomiting, and  fevers.    Chest CT shows: 2.3 cm spiculated nodule in the posterior superior right upper lobe suspicious for malignancy.Endobronchial secretions/debris in the lingula and left lower lobe. Pneumonia is also possible.Tiny right pleural effusion.  Stable 2.8 cm abdominal aortic aneurysm.    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      Review of Systems   Constitutional: Positive for appetite change and fatigue. Negative for activity change, chills, diaphoresis and fever.   HENT: Negative.    Eyes: Negative.    Respiratory: Positive for cough, shortness of breath and wheezing.    Cardiovascular: Negative.    Gastrointestinal: Negative.    Endocrine: Negative.    Genitourinary: Negative.    Musculoskeletal: Negative.    Skin: Negative.    Allergic/Immunologic: Negative.    Neurological: Negative.    Hematological: Negative.    Psychiatric/Behavioral: Negative.         Personal History     Past Medical History:   Diagnosis Date   • Cancer (CMS/HCC)    • DVT (deep venous thrombosis) (CMS/HCC)    • Hypertension        Past Surgical History:   Procedure Laterality Date   • COLON SURGERY     • JOINT REPLACEMENT         Family History: family history is not on file. Otherwise pertinent FHx was reviewed and not pertinent to current issue.    Social History:  reports that she has been smoking. She has  been smoking about 0.50 packs per day. She does not have any smokeless tobacco history on file. She reports previous alcohol use. She reports that she does not use drugs.    Home Medications:  apixaban, furosemide, losartan, megestrol, and metoprolol succinate XL    Allergies:  Allergies   Allergen Reactions   • Influenza Vaccines Anaphylaxis   • Megestrol Itching       Objective    Objective     Vitals:   Temp:  [97.3 °F (36.3 °C)-98.6 °F (37 °C)] 98.1 °F (36.7 °C)  Heart Rate:  [60-67] 64  Resp:  [17-33] 33  BP: (121-188)/(52-81) 121/60  Flow (L/min):  [4] 4    Physical Exam    Result Review    Result Review:  I have personally reviewed the results from the time of this admission to 6/6/2021 23:17 EDT and agree with these findings:  [x]  Laboratory  []  Microbiology  [x]  Radiology  []  EKG/Telemetry   []  Cardiology/Vascular   []  Pathology  []  Old records  []  Other:  Most notable findings include: Spiculated nodule on lung CT.  Assessment/Plan   Assessment / Plan     Brief Patient Summary:  Elsa Terry is a 85 y.o. female who presents with worsening shortness of breath for 1 week.     Active Hospital Problems:  Active Hospital Problems    Diagnosis    • Pneumonia due to infectious organism    • SOB (shortness of breath)    • HTN (hypertension)    • History of pulmonary embolism      Plan:     #1 Acute hypoxic respiratory failure secondary to #2, 3, 4  -patient not on home O2.      #2 Possible left lower lobe pneumonia  -Started on Rocephin and azithromycin.  Low threshold to stop.    #3 COPD exacerbation  --DuoNeb, Brovana, and Pulmicort.  -Prednisone 40 mg for 5 days  -Mucinex.   -Aggressive pulmonary toilet    #4 2.3 cm spiculated nodule in the posterior superior right upper lobe suspicious   -Suspicious for malignancy  -Patient will need biopsy  -She is on Eliquis which will need to be held for biopsy  -Pulmonology consulted for further plan.     #5 History of recurrent PE  -On 2.5 mg of Eliquis.  Adjusted for age and BMI.     #6  Severe protein calorie malnutrition and low body weight BMI 16.8  -Nutritional consult  -Dietary supplements    #7 Poor oral intake  -Start drobinol  -Steroid may also help augment her appetite  -Gentle hydration.  Will need to resume Lasix later during the hospital course.  -check thyroid studies    #8 GERD  -Continue Pepcid    #9 Hypertension  -Continue losartan and metoprolol    #10 Anemia (improved from last admission but still low)  -Iron panel, B12, folic acid.   -reports no dark stool.     #11 Patient had debris in lungs  -could be silently aspirating.Speech consulted.       DVT prophylaxis: On Eliquis adjusted for age and BMI  Medical DVT prophylaxis orders are present.    CODE STATUS:    Level Of Support Discussed With: Patient  Code Status: CPR  Medical Interventions (Level of Support Prior to Arrest): Full    Admission Status:  I believe this patient meets inaptient status.    Electronically signed by Kerrie Rivera DO, 06/06/21, 11:17 PM EDT.

## 2021-06-07 NOTE — THERAPY EVALUATION
Acute Care - Speech Language Pathology   Swallow Initial Evaluation ANGELINE Ann     Patient Name: Elsa Terry  : 1935  MRN: [id number]  Today's Date: 2021               Admit Date: 2021    Visit Dx:     ICD-10-CM ICD-9-CM   1. Pneumonia due to infectious organism, unspecified laterality, unspecified part of lung  J18.9 486   2. Lung mass  R91.8 786.6   3. Hypoxia  R09.02 799.02   4. Oropharyngeal dysphagia  R13.12 787.22     Patient Active Problem List   Diagnosis   • Pneumonia due to infectious organism   • SOB (shortness of breath)   • HTN (hypertension)   • History of pulmonary embolism     Past Medical History:   Diagnosis Date   • Cancer (CMS/HCC)     Colon Cancer approx    • DVT (deep venous thrombosis) (CMS/[facility])    • Elevated cholesterol    • Hypertension      Past Surgical History:   Procedure Laterality Date   • BREAST SURGERY      Lumpectomy   • COLON SURGERY     • JOINT REPLACEMENT      Right Hip ORIF approx        PAIN SCALE: No pain reported    PRECAUTIONS/CONTRAINDICATIONS: None noted    SUSPECTED ABUSE/NEGLECT/EXPLOITATION: None noted    SOCIAL/PSYCHOLOGICAL NEEDS/BARRIERS: None noted    PAST SOCIAL HISTORY: Smoker    PRIOR FUNCTION: Independent    PATIENT GOALS/EXPECTATIONS: Did not report    HISTORY: This patient is an 85-year-old white female admitted with left lower lobe pneumonia.  Patient referred for clinical swallowing evaluation to determine safety of p.o. intake    CURRENT DIET LEVEL: Regular diet    OBJECTIVE:    TEST ADMINISTERED: Clinical dysphagia evaluation    COGNITION/SAFETY AWARENESS: Alert and oriented    BEHAVIORAL OBSERVATIONS: Pleasant and cooperative    ORAL MOTOR EXAM: Generalized oral motor weakness is noted.  Adequate dentition    VOICE QUALITY: Mild hoarseness, reduced intensity    REFLEX EXAM: On cough on command    POSTURE: 90 degrees upright    FEEDING/SWALLOWING FUNCTION: Assessed with thin liquids from cup and straw, purée consistencies and  soft solids    CLINICAL OBSERVATIONS: Oral stage is characterized mildly prolonged mastication.  Timely oral transit.  Some shortness of breath throughout.  Swallow completed with thin liquids without clinical signs or symptoms of aspiration.  Some mildly reduced coordination with audible swallow noted at times with thin liquids.  Swallow completed with purée without clinical signs or symptoms of aspiration.  Swallow completed with soft solids without clinical signs or symptoms of aspiration.  Patient denies globus sensation after completion of swallow.    DYSPHAGIA CRITERIA: Risk of aspiration    FUNCTIONAL ASSESSMENT INSTRUMENT: Patient currently scored a level 5 of 7 on Functional Communication Measures for swallowing indicating a 20-39% limitation in function.    SLP Recommendation and Plan  ASSESSMENT/ PLAN OF CARE:  Pt presents with limitations, noted below, that impede her ability to swallow safely. The skills of a therapist will be required to safely and effectively implement the following treatment plan to restore maximal level of function.    PROBLEMS:  1.  Dysphagia   LTG 1: (30 days) patient will tolerate least restrictive diet without clinical signs or symptoms of aspiration improve functional communication measures for  swallowing to a 7 of 7.   STG 1a: (14 days) patient will tolerate mechanical soft diet and single sips of thin liquids without clinical signs or symptoms of aspiration.   STG 1b: (14 days) patient tolerate therapeutic trials of regular solids without signs or symptoms of aspiration with 8 of 10 trials.   STG 1c: (14 days) patient will utilize compensatory swallow strategies independently   STG 1d: (14 days) patient/family teaching regarding diet recommendations, safe swallow strategies and signs and symptoms of aspiration.   TREATMENT: Dysphagia therapy to ensure diet tolerance, advance to least restrictive diet and analyze for aspiration risk.      FREQUENCY/DURATION: Daily 5 times a  week    REHAB POTENTIAL:  Pt has good rehab potential.  The following limitations may influence improvement/ length of tx medical status.    RECOMMENDATIONS:   1.   DIET: Mechanical soft diet-single sips of thin liquids    2.  POSITION: 90 degrees upright for all intake    3.  COMPENSATORY STRATEGIES: Oral meds in applesauce, small bites/drinks, energy conservation strategies.    Pt/responsible party agrees with plan of care and has been informed of all alternatives, risks and benefits.      EDUCATION  The patient has been educated in the following areas:   Dysphagia (Swallowing Impairment).             Marielle Valle, SLP  6/7/2021

## 2021-06-07 NOTE — SIGNIFICANT NOTE
06/07/21 0835   Provider Notification   Reason for Communication Status update   Provider Name   (DR DIANELYS BRUCE)   Notification Route Phone call   Response See orders  (LOW SATS)

## 2021-06-07 NOTE — PLAN OF CARE
Goal Outcome Evaluation:  Plan of Care Reviewed With: patient, daughter      Dysphagia therapy daily 5 times a week for 4 weeks  Recommend: Mechanical soft diet - Sips of thin liquids, 90 degrees upright for all intake, energy conservation strategies, small single sips, oral meds in applesauce

## 2021-06-07 NOTE — PLAN OF CARE
Goal Outcome Evaluation:      Patient's pain is at a tolerable level. O2 sats on 4L 88-89%, resp increased o2 to 5LPM. Voided once, pateitn able to get up to the BSC. No c/o pain or discomfort. Had some difficulty with pills getting stuck in esophagus, but washed down with applesauce.

## 2021-06-07 NOTE — PLAN OF CARE
Goal Outcome Evaluation:           Progress: no change  Outcome Summary: Had to put pt on Bipap today due to low O2 sats on NC, tolerating well

## 2021-06-07 NOTE — CONSULTS
"Nutrition Services    Patient Name: Elsa Terry  YOB: 1935  MRN: [id number]  Admission date: 6/6/2021    Meets criteria for severe malnutrition.  Adding thrive BID.    CLINICAL NUTRITION ASSESSMENT      Reason for Assessment  MST score 2+, BMI     H&P:    Past Medical History:   Diagnosis Date   • Cancer (CMS/HCC)     Colon Cancer approx 2005   • DVT (deep venous thrombosis) (CMS/[facility])    • Elevated cholesterol    • Hypertension           Current Problems:   Active Hospital Problems    Diagnosis    • Pneumonia due to infectious organism    • SOB (shortness of breath)    • HTN (hypertension)    • History of pulmonary embolism           Nutrition/Diet History         Narrative     Met w/ pt and daughter-in-law.  Pt reported decreased haresh x 1 year. In that same time pt has lost 50-60lbs. Daughterinlaw reported her wt was 77lbs 1 month ago at an office visit (10% wt loss in 1 month).  She has had quick satiation of meals and thus hasn't been eating a normal amount.  Uses dentures at mealtimes, however they are loose fitting. Per family, intake is improving w/ addition of medication. Pt requested diet education on protein rich foods.   Pt is receptive to thrive BID (540kcal, 18gPro)   Factors Affecting   Nutritional Intake Decreased appetite, dentures.      Estimated/Assessed Needs       Energy Requirements    Height for Calculation  Height: 139.7 cm (55\")   Weight for Calculation 33kg   Method for Estimation  25-30kcal/kg   EST Needs (kcal/day) 825-1155       Protein Requirements    Weight for Calculation 33kg   EST Protein Needs (g/kg) 1.0-1.2g/kg   EST Daily Needs (g/day) 33-40       Fluid Requirements     Estimated Needs (mL/day) 825       Anthropometrics        Current Height, Weight Height: 139.7 cm (55\")  Weight: 33 kg (72 lb 12 oz) (06/07/21 0500)        Flowsheet Rows      First Filed Value   Admission Height  142.2 cm (56\") Documented at 06/06/2021 1430   Admission Weight  34 kg (74 lb 15.3 " oz) Documented at 06/06/2021 1430             Ideal Body Weight (IBW) 56.8kg   % Ideal Body Weight 58%       Usual Body Weight 54.5kg   % Usual Body Weight 58%   Wt Change Observation No recent wt change   Weight Hx    Wt Readings from Last 30 Encounters:   06/07/21 0500 33 kg (72 lb 12 oz)   06/06/21 2300 33 kg (72 lb 12 oz)   06/06/21 1705 34 kg (74 lb 15.3 oz)   06/06/21 1430 34 kg (74 lb 15.3 oz)        BMI kg/m2 Body mass index is 16.91 kg/m².       Labs/Medications         Pertinent Labs -   Results from last 7 days   Lab Units 06/07/21  0506 06/06/21  1504   SODIUM mmol/L 140 145   POTASSIUM mmol/L 3.7 4.3   CHLORIDE mmol/L 110* 111*   CO2 mmol/L 19.4* 23.3   BUN mg/dL 27* 31*   CREATININE mg/dL 0.99 1.02*   CALCIUM mg/dL 9.1 9.4   BILIRUBIN mg/dL  --  0.3   ALK PHOS U/L  --  73   ALT (SGPT) U/L  --  10   AST (SGOT) U/L  --  24   GLUCOSE mg/dL 81 94     Results from last 7 days   Lab Units 06/07/21  0506   MAGNESIUM mg/dL 1.9   PHOSPHORUS mg/dL 3.5   HEMOGLOBIN g/dL 10.0*   HEMATOCRIT % 33.4*     No results found for: COVID19  No results found for: HGBA1C      Pertinent Medications Reviewed.     Malnutrition Severity Assessment      Patient meets criteria for : Severe Malnutrition     Malnutrition Type (last 8 hours)      Malnutrition Severity Assessment     Row Name 06/08/21 1258       Malnutrition Severity Assessment    Malnutrition Type  Chronic Disease - Related Malnutrition    Row Name 06/08/21 1258       Insufficient Energy Intake     Insufficient Energy Intake Findings  Severe    Insufficient Energy Intake   <75% of est. energy requirement for > or equal to 3 months    Row Name 06/08/21 1258       Unintentional Weight Loss     Unintentional Weight Loss Findings  Severe    Unintentional Weight Loss   Weight loss greater than 5% in one month    Row Name 06/08/21 1258       Muscle Loss    Loss of Muscle Mass Findings  Severe    [place]  Severe - deep hollowing/scooping, lack of muscle to touch,  facial bones well defined    Clavicle Bone Region  Severe - protruding prominent bone    Acromion Bone Region  Severe - squared shoulders, bones, and acromion process protrusion prominent    Dorsal Hand Region  Severe - prominent depression    Row Name 06/08/21 1258       Fat Loss    Subcutaneous Fat Loss Findings  Severe    Orbital Region   Severe - pronounced hollowness/depression, dark circles, loose saggy skin    Upper Arm Region  Severe - mostly skin, very little space between folds, fingers touch    Thoracic & Lumbar Region  Severe - ribs visible with prominent depressions, iliac crest very prominent    Row Name 06/08/21 1258       Criteria Met (Must meet criteria for severity in at least 2 of these categories: M Wasting, Fat Loss, Fluid, Secondary Signs, Wt. Status, Intake)    Patient meets criteria for   Severe Malnutrition           --  Current Nutrition Orders & Evaluation of Intake       Oral Nutrition     Current PO Diet Diet Regular   Supplement Thrive BID   PO Evaluation     % PO Intake 25-50%   --  Nutrition Diagnosis         Nutrition Dx Problem 1 Severe malnutrition related to inadequate energy Intake as evidenced by muscle wasting., fat loss., inadequate energy intake., decreased appetite., patient report. and family report.       Intervention Goal         Intervention Goal(s) 50% intake of meals and supplements     Nutrition Intervention        RD Medical Nutrition Therapy for wt gain, Thrive BID   --  Monitor/Evaluation        Monitor PO intake, Supplement intake, Pertinent labs, Weight     Electronically signed by:  Henrietta Rivera RD  06/07/21 09:39 EDT

## 2021-06-07 NOTE — PLAN OF CARE
Goal Outcome Evaluation:      PATIENT IS WEARING BIPAP EXCEPT FOR MEALS & BREAKS; WHEN PATIENT IS NOT WEARING BIPAP PATIENT IS ON A NON REBREATHER. SETTINGS ARE 15/5 WITH A RATE OR 14 & ON 50%; PATIENT IS TOLERATING WELL WITH HELP OF FAMILY

## 2021-06-08 PROCEDURE — 87070 CULTURE OTHR SPECIMN AEROBIC: CPT | Performed by: INTERNAL MEDICINE

## 2021-06-08 PROCEDURE — 94799 UNLISTED PULMONARY SVC/PX: CPT

## 2021-06-08 PROCEDURE — 97165 OT EVAL LOW COMPLEX 30 MIN: CPT

## 2021-06-08 PROCEDURE — 63710000001 PREDNISONE PER 1 MG: Performed by: HOSPITALIST

## 2021-06-08 PROCEDURE — 25010000002 CEFTRIAXONE PER 250 MG: Performed by: HOSPITALIST

## 2021-06-08 PROCEDURE — 94760 N-INVAS EAR/PLS OXIMETRY 1: CPT

## 2021-06-08 PROCEDURE — 92526 ORAL FUNCTION THERAPY: CPT

## 2021-06-08 PROCEDURE — 87205 SMEAR GRAM STAIN: CPT | Performed by: INTERNAL MEDICINE

## 2021-06-08 PROCEDURE — 63710000001 DRONABINOL PER 2.5 MG: Performed by: HOSPITALIST

## 2021-06-08 PROCEDURE — 94668 MNPJ CHEST WALL SBSQ: CPT

## 2021-06-08 RX ADMIN — GUAIFENESIN 600 MG: 600 TABLET, EXTENDED RELEASE ORAL at 09:30

## 2021-06-08 RX ADMIN — SODIUM CHLORIDE, PRESERVATIVE FREE 10 ML: 5 INJECTION INTRAVENOUS at 09:30

## 2021-06-08 RX ADMIN — DRONABINOL 5 MG: 2.5 CAPSULE ORAL at 09:30

## 2021-06-08 RX ADMIN — GUAIFENESIN 600 MG: 600 TABLET, EXTENDED RELEASE ORAL at 20:32

## 2021-06-08 RX ADMIN — DRONABINOL 5 MG: 2.5 CAPSULE ORAL at 20:32

## 2021-06-08 RX ADMIN — ARFORMOTEROL TARTRATE 15 MCG: 15 SOLUTION RESPIRATORY (INHALATION) at 07:12

## 2021-06-08 RX ADMIN — APIXABAN 2.5 MG: 2.5 TABLET, FILM COATED ORAL at 09:30

## 2021-06-08 RX ADMIN — SODIUM CHLORIDE, PRESERVATIVE FREE 10 ML: 5 INJECTION INTRAVENOUS at 20:34

## 2021-06-08 RX ADMIN — BUDESONIDE 0.5 MG: 0.5 INHALANT ORAL at 19:19

## 2021-06-08 RX ADMIN — LOSARTAN POTASSIUM 100 MG: 50 TABLET, FILM COATED ORAL at 09:30

## 2021-06-08 RX ADMIN — ARFORMOTEROL TARTRATE 15 MCG: 15 SOLUTION RESPIRATORY (INHALATION) at 19:15

## 2021-06-08 RX ADMIN — IPRATROPIUM BROMIDE AND ALBUTEROL SULFATE 3 ML: .5; 2.5 SOLUTION RESPIRATORY (INHALATION) at 19:20

## 2021-06-08 RX ADMIN — BUDESONIDE 0.5 MG: 0.5 INHALANT ORAL at 07:12

## 2021-06-08 RX ADMIN — IPRATROPIUM BROMIDE AND ALBUTEROL SULFATE 3 ML: .5; 2.5 SOLUTION RESPIRATORY (INHALATION) at 07:12

## 2021-06-08 RX ADMIN — IPRATROPIUM BROMIDE AND ALBUTEROL SULFATE 3 ML: .5; 2.5 SOLUTION RESPIRATORY (INHALATION) at 12:00

## 2021-06-08 RX ADMIN — CEFTRIAXONE 1 G: 10 INJECTION, POWDER, FOR SOLUTION INTRAVENOUS at 20:34

## 2021-06-08 RX ADMIN — PREDNISONE 40 MG: 20 TABLET ORAL at 09:30

## 2021-06-08 RX ADMIN — FAMOTIDINE 40 MG: 20 TABLET, FILM COATED ORAL at 09:30

## 2021-06-08 RX ADMIN — METOPROLOL SUCCINATE 25 MG: 25 TABLET, EXTENDED RELEASE ORAL at 09:30

## 2021-06-08 RX ADMIN — DOCUSATE SODIUM 50MG AND SENNOSIDES 8.6MG 2 TABLET: 8.6; 5 TABLET, FILM COATED ORAL at 09:30

## 2021-06-08 RX ADMIN — IPRATROPIUM BROMIDE AND ALBUTEROL SULFATE 3 ML: .5; 2.5 SOLUTION RESPIRATORY (INHALATION) at 00:38

## 2021-06-08 RX ADMIN — APIXABAN 2.5 MG: 2.5 TABLET, FILM COATED ORAL at 20:34

## 2021-06-08 NOTE — PLAN OF CARE
Goal Outcome Evaluation:  Plan of Care Reviewed With: patient, daughter (Daughter in-law)        Progress: no change  Outcome Summary: Pt presents with limitations in balance and endurance that impede her ability to return home safely and independently.

## 2021-06-08 NOTE — THERAPY EVALUATION
Patient Name: Elsa Terry  : 1935    MRN: [id number]                              Today's Date: 2021       Admit Date: 2021    Visit Dx:     ICD-10-CM ICD-9-CM   1. Pneumonia due to infectious organism, unspecified laterality, unspecified part of lung  J18.9 486   2. Lung mass  R91.8 786.6   3. Hypoxia  R09.02 799.02   4. Oropharyngeal dysphagia  R13.12 787.22   5. Decreased activities of daily living (ADL)  Z78.9 V49.89     Patient Active Problem List   Diagnosis   • Pneumonia due to infectious organism   • HTN (hypertension)   • Mass of upper lobe of right lung   • Abnormal pleural fluid   • COPD with hypoxia (CMS/HCC)   • Nicotine dependence   • Impaired attitude towards nutritional status     Past Medical History:   Diagnosis Date   • Abnormal pleural fluid    • Colon cancer (CMS/HCC)    • COPD (chronic obstructive pulmonary disease) (CMS/HCC)    • DVT (deep venous thrombosis) (CMS/HCC)    • Elevated cholesterol    • Hypertension    • Mass of upper lobe of right lung    • Osteoporosis    • Pneumonia      Past Surgical History:   Procedure Laterality Date   • BREAST SURGERY      Lumpectomy   • CATARACT EXTRACTION     • COLON SURGERY     • HERNIA REPAIR     • HIP ARTHROPLASTY     • JOINT REPLACEMENT      Right Hip ORIF approx    • TONSILLECTOMY     • TUBAL ABDOMINAL LIGATION       General Information     Row Name 21 1306          OT Time and Intention    Document Type  evaluation  -LF     Mode of Treatment  individual therapy;occupational therapy  -LF     Row Name 21 1306          General Information    Patient Profile Reviewed  yes  -LF     Prior Level of Function  -- Requires assist with ADLs as needed, ambulates short distances with a rolling walker, and uses a wheelchair for long distances. She has a step over tub with chair/handheld shower head/grab bars, an elevated commode, sits to groom, and has a hosptial bed.  -LF     Existing Precautions/Restrictions  fall  -LF      Barriers to Rehab  none identified  -     Row Name 06/08/21 1306          Occupational Profile    Reason for Services/Referral (Occupational Profile)  Occupational therapy consulted due to recent decline in ADLs/functional transfers.  -     Row Name 06/08/21 1306          Living Environment    Lives With  spouse;other (see comments) Family lives nearby and assists as needed. Also has two hired caregivers for a couple hours in the morning and nighttime who assist with ADLs and transfers.  -     Row Name 06/08/21 1306          Home Main Entrance    Number of Stairs, Main Entrance  one  -     Row Name 06/08/21 1306          Cognition    Orientation Status (Cognition)  oriented x 3  -     Row Name 06/08/21 1306          Safety Issues, Functional Mobility    Impairments Affecting Function (Mobility)  balance;endurance/activity tolerance;strength  -     Comment, Safety Issues/Impairments (Mobility)  Therapeutic exercises to address endurance.  -       User Key  (r) = Recorded By, (t) = Taken By, (c) = Cosigned By    Initials Name Provider Type     Rachael Ellison OT Occupational Therapist          Mobility/ADL's     [facility] Name 06/08/21 1314          Bed Mobility    Bed Mobility  bed mobility (all) activities  -     All Activities, [place] (Bed Mobility)  minimum assist (75% patient effort)  -     Assistive Device (Bed Mobility)  bed rails;head of bed elevated  -[facility] Name 06/08/21 1314          Transfers    Transfers  sit-stand transfer  -     Sit-Stand [place] (Transfers)  contact guard  -[facility] Name 06/08/21 1314          Sit-Stand Transfer    Assistive Device (Sit-Stand Transfers)  other (see comments) Bedrail  -     Row Name 06/08/21 1314          Activities of Daily Living    BADL Assessment/Intervention  bathing;upper body dressing;lower body dressing;grooming;feeding;toileting  -[facility] Name 06/08/21 1314          Bathing Assessment/Intervention    [place] Level  (Bathing)  bathing skills;upper body;minimum assist (75% patient effort);lower body;moderate assist (50% patient effort)  -LF     Assistive Devices (Bathing)  other (see comments)  -LF     Position (Bathing)  supported standing;supported sitting  -LF     Comment (Bathing)  Rolling walker  -LF     Row Name 06/08/21 1314          Upper Body Dressing Assessment/Training    [place] Level (Upper Body Dressing)  upper body dressing skills;minimum assist (75% patient effort)  -LF     Position (Upper Body Dressing)  supported sitting  -LF     Row Name 06/08/21 1314          Lower Body Dressing Assessment/Training    [place] Level (Lower Body Dressing)  lower body dressing skills;moderate assist (50% patient effort)  -LF     Position (Lower Body Dressing)  supported standing;supported sitting  -LF     Row Name 06/08/21 1314          Grooming Assessment/Training    [place] Level (Grooming)  grooming skills;standby assist  -LF     Position (Grooming)  supported sitting  -LF     Row Name 06/08/21 1314          Self-Feeding Assessment/Training    [place] Level (Feeding)  feeding skills;standby assist  -LF     Position (Self-Feeding)  supported sitting  -LF     Row Name 06/08/21 1314          Toileting Assessment/Training    [place] Level (Toileting)  toileting skills;moderate assist (50% patient effort)  -LF     Assistive Devices (Toileting)  commode, 3-in-1  -LF     Position (Toileting)  supported standing;supported sitting  -LF     Comment (Toileting)  Rolling walker  -LF       User Key  (r) = Recorded By, (t) = Taken By, (c) = Cosigned By    Initials Name Provider Type    LF Rachael Ellison OT Occupational Therapist        Obj/Interventions     Row Name 06/08/21 1317          Sensory Assessment (Somatosensory)    Sensory Assessment (Somatosensory)  UE sensation intact  -     Row Name 06/08/21 1317          Vision Assessment/Intervention    Visual Impairment/Limitations  WFL  -     Row Name  06/08/21 1317          Range of Motion Comprehensive    General Range of Motion  bilateral upper extremity ROM WFL  -     Row Name 06/08/21 1317          Strength Comprehensive (MMT)    General Manual Muscle Testing (MMT) Assessment  upper extremity strength deficits identified  -     Comment, General Manual Muscle Testing (MMT) Assessment  3+/5 bilateral upper extremities  -     Row Name 06/08/21 1317          Motor Skills    Motor Skills  coordination;functional endurance  -     Coordination  left;WFL;right;fine motor deficit Patient is right hand dominant, reporting tremors impairing her fine motor coordination.  -LF     Functional Endurance  Poor  -     Row Name 06/08/21 1317          Balance    Balance Assessment  sitting static balance;sit to stand dynamic balance  -     Static Sitting Balance  mild impairment Good on edge of bed  -     Sit to Stand Dynamic Balance  mild impairment Fair with handheld assist and holding onto bedrail  -       User Key  (r) = Recorded By, (t) = Taken By, (c) = Cosigned By    Initials Name Provider Type     Rachael Ellison, OT Occupational Therapist        Goals/Plan     [facility] Name 06/08/21 1323          Transfer Goal 1 (OT)    Activity/Assistive Device (Transfer Goal 1, OT)  transfers, all  -LF     [place] Level/Cues Needed (Transfer Goal 1, OT)  supervision required  -     Time Frame (Transfer Goal 1, OT)  long term goal (LTG);10 days  -[facility] Name 06/08/21 1323          Bathing Goal 1 (OT)    Activity/Device (Bathing Goal 1, OT)  bathing skills, all;lower body bathing  -     [place] Level/Cues Needed (Bathing Goal 1, OT)  minimum assist (75% or more patient effort)  -     Time Frame (Bathing Goal 1, OT)  long term goal (LTG);10 days  -[facility] Name 06/08/21 1323          Dressing Goal 1 (OT)    Activity/Device (Dressing Goal 1, OT)  dressing skills, all;lower body dressing  -LF     [place]/Cues Needed (Dressing Goal 1, OT)  minimum  assist (75% or more patient effort)  -LF     Time Frame (Dressing Goal 1, OT)  long term goal (LTG);10 days  -     Row Name 06/08/21 1323          Toileting Goal 1 (OT)    Activity/Device (Toileting Goal 1, OT)  toileting skills, all  -LF     [place] Level/Cues Needed (Toileting Goal 1, OT)  minimum assist (75% or more patient effort)  -LF     Time Frame (Toileting Goal 1, OT)  long term goal (LTG);10 days  -     Row Name 06/08/21 1323          Strength Goal 1 (OT)    Strength Goal 1 (OT)  4-/5  -LF     Time Frame (Strength Goal 1, OT)  long term goal (LTG);10 days  -     Row Name 06/08/21 1323          Therapy Assessment/Plan (OT)    Planned Therapy Interventions (OT)  activity tolerance training;patient/caregiver education/training;BADL retraining;strengthening exercise;occupation/activity based interventions;functional balance retraining;transfer/mobility retraining  -       User Key  (r) = Recorded By, (t) = Taken By, (c) = Cosigned By    Initials Name Provider Type     Rachael Ellison OT Occupational Therapist        Clinical Impression     Row Name 06/08/21 1321          Pain Assessment    Additional Documentation  Pain Scale: FACES Pre/Post-Treatment (Group)  -[facility] Name 06/08/21 1321          Pain Scale: FACES Pre/Post-Treatment    Pain: FACES Scale, Pretreatment  0-->no hurt  -     Posttreatment Pain Rating  0-->no hurt  -[facility] Name 06/08/21 1321          Plan of Care Review    Plan of Care Reviewed With  patient;daughter Daughter in-law  -LF     Progress  no change  -     Outcome Summary  Pt presents with limitations in balance and endurance that impede her ability to return home safely and independently.  -     Row Name 06/08/21 1321          Therapy Assessment/Plan (OT)    Patient/Family Therapy Goal Statement (OT)  To maximize independence.  -LF     Rehab Potential (OT)  good, to achieve stated therapy goals  -     Criteria for Skilled Therapeutic Interventions Met  (OT)  yes;meets criteria;skilled treatment is necessary  -LF     Therapy Frequency (OT)  5 times/wk  -     Row Name 06/08/21 1321          Therapy Plan Review/Discharge Plan (OT)    Anticipated Discharge Disposition (OT)  skilled nursing facility  -     Row Name 06/08/21 1321          Vital Signs    O2 Delivery Pre Treatment  nasal cannula 5 liters  -LF     O2 Delivery Post Treatment  nasal cannula 5 liters  -LF       User Key  (r) = Recorded By, (t) = Taken By, (c) = Cosigned By    Initials Name Provider Type     Rachael Ellison OT Occupational Therapist        Outcome Measures     Row Name 06/08/21 1324          How much help from another is currently needed...    Putting on and taking off regular lower body clothing?  2  -LF     Bathing (including washing, rinsing, and drying)  2  -LF     Toileting (which includes using toilet bed pan or urinal)  2  -LF     Putting on and taking off regular upper body clothing  3  -LF     Taking care of personal grooming (such as brushing teeth)  3  -LF     Eating meals  3  -LF     AM-PAC 6 Clicks Score (OT)  15  -LF     Row Name 06/08/21 1324          Functional Assessment    Outcome Measure Options  AM-PAC 6 Clicks Daily Activity (OT);Optimal Instrument  -LF     Row Name 06/08/21 1324          Optimal Instrument    Optimal Instrument  Optimal - 3  -LF     Bending/Stooping  3  -LF     Standing  2  -LF     Reaching  2  -LF     From the list, choose the 3 activities you would most like to be able to do without any difficulty  Bending/stooping;Reaching;Standing  -LF     Total Score Optimal - 3  7  -LF       User Key  (r) = Recorded By, (t) = Taken By, (c) = Cosigned By    Initials Name Provider Type     Rachael Ellison OT Occupational Therapist        Occupational Therapy Education                 Title: PT OT SLP Therapies (Done)     Topic: Occupational Therapy (Done)     Point: ADL training (Done)     Description:   Instruct learner(s) on proper safety adaptation and  remediation techniques during self care or transfers.   Instruct in proper use of assistive devices.              Learning Progress Summary           Patient Acceptance, E,TB, VU by  at 6/8/2021 1329   Family Acceptance, E,TB, VU by  at 6/8/2021 1329                   Point: Home exercise program (Done)     Description:   Instruct learner(s) on appropriate technique for monitoring, assisting and/or progressing therapeutic exercises/activities.              Learning Progress Summary           Patient Acceptance, E,TB, VU by  at 6/8/2021 1329   Family Acceptance, E,TB, VU by  at 6/8/2021 1329                   Point: Precautions (Done)     Description:   Instruct learner(s) on prescribed precautions during self-care and functional transfers.              Learning Progress Summary           Patient Acceptance, E,TB, VU by  at 6/8/2021 1329   Family Acceptance, E,TB, VU by  at 6/8/2021 1329                   Point: Body mechanics (Done)     Description:   Instruct learner(s) on proper positioning and spine alignment during self-care, functional mobility activities and/or exercises.              Learning Progress Summary           Patient Acceptance, E,TB, VU by  at 6/8/2021 1329   Family Acceptance, E,TB, VU by  at 6/8/2021 1329                               User Key     Initials Effective Dates Name Provider Type Discipline     03/31/21 -  Rachael Ellison OT Occupational Therapist OT              OT Recommendation and Plan  Planned Therapy Interventions (OT): activity tolerance training, patient/caregiver education/training, BADL retraining, strengthening exercise, occupation/activity based interventions, functional balance retraining, transfer/mobility retraining  Therapy Frequency (OT): 5 times/wk  Plan of Care Review  Plan of Care Reviewed With: patient, daughter (Daughter in-law)  Progress: no change  Outcome Summary: Pt presents with limitations in balance and endurance that impede her ability  to return home safely and independently.     Time Calculation:   Time Calculation- OT     Row Name 06/08/21 1332             Time Calculation- OT    OT Received On  06/08/21  -LF      OT Goal Re-Cert Due Date  06/18/21  -LF         Untimed Charges    OT Eval/Re-eval Minutes  35  -LF         Total Minutes    Untimed Charges Total Minutes  35  -LF       Total Minutes  35  -LF        User Key  (r) = Recorded By, (t) = Taken By, (c) = Cosigned By    Initials Name Provider Type    LF Rachael Ellison OT Occupational Therapist        Therapy Charges for Today     Code Description Service Date Service Provider Modifiers Qty    [id number] HC OT EVAL LOW COMPLEXITY 3 6/8/2021 Rachael Ellison OT GO 1               Rachael Ellison OT  6/8/2021

## 2021-06-08 NOTE — PROGRESS NOTES
[facility]     Progress Note    Patient Name: Elsa Terry  : 1935  MRN: [id number]  Primary Care Physician:  Joaquin Oliveira MD  Date of admission: 2021    Subjective   Subjective     Chief Complaint: Dyspnea on admission.  Breathing better today    HPI:  Patient Reports she is breathing better today.  Mental status is also better.  She is answering simple questions    Review of Systems   Review of Systems   Constitutional: Positive for unexpected weight change. Negative for activity change.   Respiratory: Positive for shortness of breath.    Gastrointestinal: Negative for abdominal distention.   Genitourinary: Negative for difficulty urinating.   Musculoskeletal: Positive for arthralgias and back pain.   Neurological: Positive for weakness.       Objective   Objective     Vitals:   Temp:  [97.3 °F (36.3 °C)-98.5 °F (36.9 °C)] 98.4 °F (36.9 °C)  Heart Rate:  [72-94] 79  Resp:  [20-26] 20  BP: (103-131)/(50-86) 120/50  Flow (L/min):  [4-10] 4  Physical Exam    Constitutional: Awake, alert, talking well today   Eyes: PERRLA, sclerae anicteric, no conjunctival injection   HENT: NCAT, mucous membranes moist   Neck: Supple, no thyromegaly, no lymphadenopathy, trachea midline   Respiratory: Clear to auscultation bilaterally, nonlabored respirations, decreased breath sounds bilaterally, scattered wheezing   Cardiovascular: RRR, no murmurs, rubs, or gallops, palpable pedal pulses bilaterally   Gastrointestinal: Positive bowel sounds, soft, nontender, nondistended   Musculoskeletal: No bilateral ankle edema, no clubbing or cyanosis to extremities.  Generalized weakness   Psychiatric: Appropriate affect, cooperative   Neurologic: Oriented x 3, strength symmetric in all extremities, Cranial Nerves grossly intact to confrontation, speech clear   Skin: No rashes     Result Review    Result Review:  I have personally reviewed the results from the time of this admission to 2021 16:21 EDT and agree with  these findings:  [x]  Laboratory  [x]  Microbiology  [x]  Radiology  []  EKG/Telemetry   []  Cardiology/Vascular   []  Pathology  []  Old records  []  Other:  Most notable findings include: Hypoxia and generalized weakness.  Right upper lobe nodule either pneumonia or mass    Assessment/Plan   Assessment / Plan     Brief Patient Summary:  Elsa Terry is a 85 y.o. female who was admitted with dyspnea wheezing cough and hypoxia    Active Hospital Problems:  Active Hospital Problems    Diagnosis    • **COPD with hypoxia (CMS/HCC)    • Mass of upper lobe of right lung    • Abnormal pleural fluid      Small right pleural fluid     • Nicotine dependence    • Impaired attitude towards nutritional status    • Pneumonia due to infectious organism    • HTN (hypertension)      Plan: Her mental status has improved since admission.  Her breathing is also better and she is doing well on nasal cannula.  Continue IV antibiotics.  Taper Solu-Medrol.  Discussed with son and daughter-in-law at bedside.  Social service evaluation for discussing disposition.       DVT prophylaxis:  Medical DVT prophylaxis orders are present.    CODE STATUS:   Level Of Support Discussed With: Patient  Code Status: CPR  Medical Interventions (Level of Support Prior to Arrest): Full      Electronically signed by Joaqiun Oliveira MD, 06/08/21, 4:21 PM EDT.

## 2021-06-08 NOTE — PROGRESS NOTES
[facility]     Progress Note    Patient Name: Elsa Terry  : 1935  MRN: [id number]  Primary Care Physician:  Joaquin Oliveira MD  Date of admission: 2021    Subjective   Subjective     Chief Complaint: Does not want to keep BiPAP  2 L nasal oxygen saturation 100%  No bowel movement last night  No acute respiratory distress at this time  Appears clinically comfortable    HPI:  Patient Reports no acute respiratory complaints at this time  Appetite fair    Review of Systems   Denies any fever feels fatigue weight loss which she cannot quantitate  HEENT: Bilateral lens implants no nasal congestion no epistaxis  Respiratory system: Scoliotic spine with good air entry bilaterally with decreased sounds in the bases wheezing on and off  Cardiovascular system: Regular rhythm no chest pain or palpitations noted  Gastrointestinal system: No abdominal pain nausea vomiting or constipation at this time  Genitourinary system: Denies dysuria hesitancy  Musculoskeletal system: Generalized weakness bilaterally  Endocrine: Easily fatigues  Hematology: No bleeding bruising or swollen glands  Psychiatric: History of anxiety  Neurologic: Easily arousable and can communicate with full sentences generalized weakness bilaterally  Skin: No edema or rash noted    Objective   Objective     Vitals:   Temp:  [97.4 °F (36.3 °C)-98.5 °F (36.9 °C)] 97.5 °F (36.4 °C)  Heart Rate:  [61-79] 72  Resp:  [18-29] 26  BP: (118-157)/(58-70) 128/69  Flow (L/min):  [5-15] 10  Physical Exam:   Constitutional: Awake, alert   Eyes: PERRLA, sclerae anicteric, no conjunctival injection   HENT: NCAT, mucous membranes moist   Neck: Supple, no thyromegaly, no lymphadenopathy, trachea midline   Respiratory: Clear to auscultation bilaterally, nonlabored respirations    Cardiovascular: RRR, no murmurs, rubs, or gallops, palpable pedal pulses bilaterally   Gastrointestinal: Positive bowel sounds, soft, nontender, nondistended   Musculoskeletal: No  bilateral ankle edema, no clubbing or cyanosis to extremities   Psychiatric: Appropriate affect, cooperative   Neurologic: Oriented x 3, strength symmetric in all extremities, Cranial Nerves grossly intact to confrontation, speech clear   Skin: No rashes    Result Review    Result Review:  I have personally reviewed the results from the time of this admission to 6/8/2021 06:15 EDT and agree with these findings:  []  Laboratory  []  Microbiology  []  Radiology  []  EKG/Telemetry   []  Cardiology/Vascular   []  Pathology  []  Old records  []  Other:  Most notable findings include: None clinically significant  We will discontinue BiPAP and continue with nasal oxygen supplement for saturation greater than 90%      Assessment/Plan   Assessment / Plan     Brief Patient Summary:  Elsa Terry is a 85 y.o. female who admitted for evaluation of right upper lobe spiculated mass and bibasilar pneumonia  Being treated with antibiotics  Patient does not want to have BiPAP will discontinue BiPAP and continue with nasal oxygen supplement    Active Hospital Problems:  Active Hospital Problems    Diagnosis    • **COPD with hypoxia (CMS/HCC)    • Mass of upper lobe of right lung    • Abnormal pleural fluid      Small right pleural fluid     • Nicotine dependence    • Impaired attitude towards nutritional status    • Pneumonia due to infectious organism    • HTN (hypertension)      Plan:   **COPD with hypoxia (CMS/HCC): Continue duo nebs Brovana and Pulmicort  Continue oxygen supplement 2 to 4 L to maintain saturation greater than 90%  Discontinue BiPAP Continue with nasal oxygen supplement      • Mass of upper lobe of right lung: We will consider diagnostic procedure when stable        • Abnormal pleural fluid: small right pleural fluid will tap when symptomatic currently no active intervention at this time              • Nicotine dependence: Continue Habitrol 21 mg patch daily     • Impaired attitude towards nutritional status:  Encourage nutrition continue Marinol orally     • Pneumonia due to infectious organism: Sputum for Gram stain cultures and sensitivity  Continue with IV Zosyn empirically      • HTN (hypertension): Continue losartan             DVT prophylaxis:  Medical DVT prophylaxis orders are present.    CODE STATUS:   Level Of Support Discussed With: Patient  Code Status: CPR  Medical Interventions (Level of Support Prior to Arrest): Full    Disposition: Per Primary MD.    Jonathan Oliveira MD

## 2021-06-08 NOTE — THERAPY TREATMENT NOTE
Acute Care - Speech Language Pathology   Swallow Treatment Note ANGELINE Ann     Patient Name: Elsa Terry  : 1935  MRN: [id number]  Today's Date: 2021               Admit Date: 2021    Visit Dx:     ICD-10-CM ICD-9-CM   1. Pneumonia due to infectious organism, unspecified laterality, unspecified part of lung  J18.9 486   2. Lung mass  R91.8 786.6   3. Hypoxia  R09.02 799.02   4. Oropharyngeal dysphagia  R13.12 787.22   5. Decreased activities of daily living (ADL)  Z78.9 V49.89     Patient Active Problem List   Diagnosis   • Pneumonia due to infectious organism   • HTN (hypertension)   • Mass of upper lobe of right lung   • Abnormal pleural fluid   • COPD with hypoxia (CMS/HCC)   • Nicotine dependence   • Impaired attitude towards nutritional status     Past Medical History:   Diagnosis Date   • Abnormal pleural fluid    • Colon cancer (CMS/HCC)    • COPD (chronic obstructive pulmonary disease) (CMS/HCC)    • DVT (deep venous thrombosis) (CMS/HCC)    • Elevated cholesterol    • Hypertension    • Mass of upper lobe of right lung    • Osteoporosis    • Pneumonia      Past Surgical History:   Procedure Laterality Date   • BREAST SURGERY      Lumpectomy   • CATARACT EXTRACTION     • COLON SURGERY     • HERNIA REPAIR     • HIP ARTHROPLASTY     • JOINT REPLACEMENT      Right Hip ORIF approx    • TONSILLECTOMY     • TUBAL ABDOMINAL LIGATION     SPEECH PATHOLOGY DYSPHAGIA TREATMENT  Subjective/Behavioral Observations: Patient alert and cooperative  Current Diet: Regular diet-thin liquids  Current Strategies: Small bites/drinks, 90 degrees upright, encourage frequent rest periods of short of breath  Treatment received: Patient seen during morning meal.  Good intake.  Patient tolerating soft solids well without signs or symptoms of aspiration.  No oral residue noted after the swallow.  Timely oral transit.  Swallow completed with thin liquids without clinical signs or symptoms of aspiration with control  trials.  Results of treatment: Tolerates mechanical soft and thin liquids without clinical signs or symptoms of aspiration    Progress toward goals: Progressing    Barriers to Achieving goals: Medical status    Plan of care:/changes in plan: Mechanical soft diet, extra sauce/gravy to each tray, thin liquids.  Continue with plan of care        EDUCATION  The patient has been educated in the following areas:   Dysphagia (Swallowing Impairment).        Marielle Valle, SLP  6/8/2021

## 2021-06-09 PROCEDURE — 63710000001 DRONABINOL PER 2.5 MG: Performed by: HOSPITALIST

## 2021-06-09 PROCEDURE — 25010000002 CEFTRIAXONE PER 250 MG: Performed by: HOSPITALIST

## 2021-06-09 PROCEDURE — 94668 MNPJ CHEST WALL SBSQ: CPT

## 2021-06-09 PROCEDURE — 94760 N-INVAS EAR/PLS OXIMETRY 1: CPT

## 2021-06-09 PROCEDURE — 97110 THERAPEUTIC EXERCISES: CPT

## 2021-06-09 PROCEDURE — 97161 PT EVAL LOW COMPLEX 20 MIN: CPT

## 2021-06-09 PROCEDURE — 94799 UNLISTED PULMONARY SVC/PX: CPT

## 2021-06-09 PROCEDURE — 63710000001 PREDNISONE PER 1 MG: Performed by: HOSPITALIST

## 2021-06-09 RX ADMIN — APIXABAN 2.5 MG: 2.5 TABLET, FILM COATED ORAL at 09:46

## 2021-06-09 RX ADMIN — METOPROLOL SUCCINATE 25 MG: 25 TABLET, EXTENDED RELEASE ORAL at 09:46

## 2021-06-09 RX ADMIN — GUAIFENESIN 600 MG: 600 TABLET, EXTENDED RELEASE ORAL at 09:45

## 2021-06-09 RX ADMIN — IPRATROPIUM BROMIDE AND ALBUTEROL SULFATE 3 ML: .5; 2.5 SOLUTION RESPIRATORY (INHALATION) at 07:43

## 2021-06-09 RX ADMIN — GUAIFENESIN 600 MG: 600 TABLET, EXTENDED RELEASE ORAL at 20:53

## 2021-06-09 RX ADMIN — ARFORMOTEROL TARTRATE 15 MCG: 15 SOLUTION RESPIRATORY (INHALATION) at 07:44

## 2021-06-09 RX ADMIN — DRONABINOL 5 MG: 2.5 CAPSULE ORAL at 09:46

## 2021-06-09 RX ADMIN — IPRATROPIUM BROMIDE AND ALBUTEROL SULFATE 3 ML: .5; 2.5 SOLUTION RESPIRATORY (INHALATION) at 19:44

## 2021-06-09 RX ADMIN — FAMOTIDINE 40 MG: 20 TABLET, FILM COATED ORAL at 09:45

## 2021-06-09 RX ADMIN — DRONABINOL 5 MG: 2.5 CAPSULE ORAL at 20:53

## 2021-06-09 RX ADMIN — Medication 5 MG: at 20:53

## 2021-06-09 RX ADMIN — ARFORMOTEROL TARTRATE 15 MCG: 15 SOLUTION RESPIRATORY (INHALATION) at 19:44

## 2021-06-09 RX ADMIN — DOCUSATE SODIUM 50MG AND SENNOSIDES 8.6MG 2 TABLET: 8.6; 5 TABLET, FILM COATED ORAL at 09:45

## 2021-06-09 RX ADMIN — IPRATROPIUM BROMIDE AND ALBUTEROL SULFATE 3 ML: .5; 2.5 SOLUTION RESPIRATORY (INHALATION) at 13:46

## 2021-06-09 RX ADMIN — SODIUM CHLORIDE, PRESERVATIVE FREE 10 ML: 5 INJECTION INTRAVENOUS at 09:47

## 2021-06-09 RX ADMIN — BUDESONIDE 0.5 MG: 0.5 INHALANT ORAL at 19:44

## 2021-06-09 RX ADMIN — BUDESONIDE 0.5 MG: 0.5 INHALANT ORAL at 07:44

## 2021-06-09 RX ADMIN — SODIUM CHLORIDE, PRESERVATIVE FREE 10 ML: 5 INJECTION INTRAVENOUS at 20:56

## 2021-06-09 RX ADMIN — LOSARTAN POTASSIUM 100 MG: 50 TABLET, FILM COATED ORAL at 09:45

## 2021-06-09 RX ADMIN — APIXABAN 2.5 MG: 2.5 TABLET, FILM COATED ORAL at 20:53

## 2021-06-09 RX ADMIN — CEFTRIAXONE 1 G: 10 INJECTION, POWDER, FOR SOLUTION INTRAVENOUS at 20:54

## 2021-06-09 RX ADMIN — IPRATROPIUM BROMIDE AND ALBUTEROL SULFATE 3 ML: .5; 2.5 SOLUTION RESPIRATORY (INHALATION) at 00:16

## 2021-06-09 RX ADMIN — PREDNISONE 40 MG: 20 TABLET ORAL at 09:45

## 2021-06-09 NOTE — PLAN OF CARE
Goal Outcome Evaluation:  Plan of Care Reviewed With: patient        Progress: improving  Outcome Summary: pt condition improving.

## 2021-06-09 NOTE — PROGRESS NOTES
[facility]     Progress Note    Patient Name: Elsa Terry  : 1935  MRN: [id number]  Primary Care Physician:  Joaquin Oliveira MD  Date of admission: 2021    Subjective   Subjective     Chief Complaint: breathing better    HPI:  Patient Reports her breathing is better,she is alert lying in bed    Review of Systems   Review of Systems   Constitutional: Positive for activity change (semi sitting up in bed) and unexpected weight change.   Respiratory: Positive for shortness of breath (improving).    Cardiovascular: Negative for palpitations and leg swelling.   Musculoskeletal: Positive for arthralgias.   Neurological: Positive for weakness.       Objective   Objective     Vitals:   Temp:  [97.7 °F (36.5 °C)-98.4 °F (36.9 °C)] 97.9 °F (36.6 °C)  Heart Rate:  [65-80] 67  Resp:  [18-20] 18  BP: (120-156)/(50-58) 126/50  Flow (L/min):  [1-4] 1  Physical Exam    Constitutional: Awake, alert, smiling ,talking   Eyes: PERRLA, sclerae anicteric, no conjunctival injection   HENT: NCAT, mucous membranes moist   Neck: Supple, no thyromegaly, no lymphadenopathy, trachea midline   Respiratory: Clear to auscultation bilaterally, nonlabored respirations ,decreased breath sounds as before   Cardiovascular: RRR, no murmurs, rubs, or gallops, palpable pedal pulses bilaterally   Gastrointestinal: Positive bowel sounds, soft, nontender, nondistended   Musculoskeletal: No bilateral ankle edema, no clubbing or cyanosis to extremities   Psychiatric: Appropriate affect, cooperative   Neurologic: Oriented x 3, strength symmetric in all extremities, Cranial Nerves grossly intact to confrontation, speech clear   Skin: No rashes     Result Review    Result Review:  I have personally reviewed the results from the time of this admission to 2021 14:08 EDT and agree with these findings:  [x]  Laboratory  [x]  Microbiology  []  Radiology  []  EKG/Telemetry   []  Cardiology/Vascular   []  Pathology  []  Old records  []   Other:  Most notable findings include: lung mass    Assessment/Plan   Assessment / Plan     Brief Patient Summary:  Elsa Terry is a 85 y.o. female who was admitted with Dyspnea and hypoxia    Active Hospital Problems:  Active Hospital Problems    Diagnosis    • **COPD with hypoxia (CMS/HCC)    • Mass of upper lobe of right lung    • Abnormal pleural fluid      Small right pleural fluid     • Nicotine dependence    • Impaired attitude towards nutritional status    • Pneumonia due to infectious organism    • HTN (hypertension)      Plan: Continue IV Antibiotics. Discussed lung mass with son and DIL. With her age,frialty,Kyphosis,malnourishment,on Chronic Oxygen,she is high risk for lung biopsy. She will be unable to tolerate any treatment if malignant. Will treat with antibiotics and monitor. If it continues to increase in size will consider biopsy.       DVT prophylaxis:  Medical DVT prophylaxis orders are present.    CODE STATUS:   Level Of Support Discussed With: Patient  Code Status: CPR  Medical Interventions (Level of Support Prior to Arrest): Full      Electronically signed by Joaquin Oliveira MD, 06/09/21, 2:08 PM EDT.

## 2021-06-09 NOTE — THERAPY TREATMENT NOTE
Patient Name: Elsa Terry  : 1935    MRN: [id number]                              Today's Date: 2021       Admit Date: 2021    Visit Dx:     ICD-10-CM ICD-9-CM   1. Pneumonia due to infectious organism, unspecified laterality, unspecified part of lung  J18.9 486   2. Lung mass  R91.8 786.6   3. Hypoxia  R09.02 799.02   4. Oropharyngeal dysphagia  R13.12 787.22   5. Decreased activities of daily living (ADL)  Z78.9 V49.89   6. Difficulty walking  R26.2 719.7     Patient Active Problem List   Diagnosis   • Pneumonia due to infectious organism   • HTN (hypertension)   • Mass of upper lobe of right lung   • Abnormal pleural fluid   • COPD with hypoxia (CMS/HCC)   • Nicotine dependence   • Impaired attitude towards nutritional status     Past Medical History:   Diagnosis Date   • Abnormal pleural fluid    • Colon cancer (CMS/HCC)    • COPD (chronic obstructive pulmonary disease) (CMS/HCC)    • DVT (deep venous thrombosis) (CMS/HCC)    • Elevated cholesterol    • Hypertension    • Mass of upper lobe of right lung    • Osteoporosis    • Pneumonia      Past Surgical History:   Procedure Laterality Date   • BREAST SURGERY      Lumpectomy   • CATARACT EXTRACTION     • COLON SURGERY     • HERNIA REPAIR     • HIP ARTHROPLASTY     • JOINT REPLACEMENT      Right Hip ORIF approx    • TONSILLECTOMY     • TUBAL ABDOMINAL LIGATION       General Information     Row Name 21 1553          OT Time and Intention    Document Type  therapy note (daily note)  -     Mode of Treatment  individual therapy;occupational therapy  -       User Key  (r) = Recorded By, (t) = Taken By, (c) = Cosigned By    Initials Name Provider Type     Rachael Ellison OT Occupational Therapist          Mobility/ADL's    No documentation.       Obj/Interventions     Row Name 21 1554          Shoulder (Therapeutic Exercise)    Shoulder (Therapeutic Exercise)  AROM (active range of motion)  -     Shoulder AROM (Therapeutic  Exercise)  bilateral Foreward reach, horizontal abduction/adduction, and rotation 10 reps x 1 set  -LF     Row Name 06/09/21 1554          Elbow/Forearm (Therapeutic Exercise)    Elbow/Forearm (Therapeutic Exercise)  AROM (active range of motion)  -LF     Elbow/Forearm AROM (Therapeutic Exercise)  bilateral;flexion;extension;supination;pronation 10 sets x 1 rep  -LF     Row Name 06/09/21 1554          Motor Skills    Motor Skills  functional endurance  -LF     Functional Endurance  Poor+  -LF     [facility] Name 06/09/21 1554          Therapeutic Exercise    Therapeutic Exercise  shoulder;elbow/forearm  -LF       User Key  (r) = Recorded By, (t) = Taken By, (c) = Cosigned By    Initials Name Provider Type    Rachael Phipps OT Occupational Therapist        Goals/Plan    No documentation.       Clinical Impression     Row Name 06/09/21 1555          Pain Scale: FACES Pre/Post-Treatment    Pain: FACES Scale, Pretreatment  0-->no hurt  -LF     Posttreatment Pain Rating  0-->no hurt  -     Row Name 06/09/21 1555          Plan of Care Review    Progress  improving  -     Outcome Summary  Patient was agreeable to upper extremity exercises, demonstrating poor+ activity tolerance.  -[facility] Name 06/09/21 1553          Vital Signs    O2 Delivery Pre Treatment  room air  -     O2 Delivery Post Treatment  room air  -       User Key  (r) = Recorded By, (t) = Taken By, (c) = Cosigned By    Initials Name Provider Type    Rachael Phipps OT Occupational Therapist        Outcome Measures     Row Name 06/09/21 1550          How much help from another is currently needed...    Putting on and taking off regular lower body clothing?  2  -LF     Bathing (including washing, rinsing, and drying)  2  -LF     Toileting (which includes using toilet bed pan or urinal)  2  -LF     Putting on and taking off regular upper body clothing  3  -LF     Taking care of personal grooming (such as brushing teeth)  3  -LF     Eating meals  3   -LF     AM-PAC 6 Clicks Score (OT)  15  -LF     Row Name 06/09/21 1400          How much help from another person do you currently need...    Turning from your back to your side while in flat bed without using bedrails?  3  -DP     Moving from lying on back to sitting on the side of a flat bed without bedrails?  3  -DP     Moving to and from a bed to a chair (including a wheelchair)?  3  -DP     Standing up from a chair using your arms (e.g., wheelchair, bedside chair)?  3  -DP     Climbing 3-5 steps with a railing?  2  -DP     To walk in hospital room?  2  -DP     AM-PAC 6 Clicks Score (PT)  16  -DP     Row Name 06/09/21 1557 06/09/21 1400       Functional Assessment    Outcome Measure Options  AM-PAC 6 Clicks Daily Activity (OT);Optimal Instrument  -LF  AM-PAC 6 Clicks Basic Mobility (PT)  -DP    Row Name 06/09/21 1557          Optimal Instrument    Bending/Stooping  3  -LF     Standing  2  -LF     Reaching  2  -LF       User Key  (r) = Recorded By, (t) = Taken By, (c) = Cosigned By    Initials Name Provider Type    LF Rachael Ellison OT Occupational Therapist    DP Jeromy Rivera, PT Physical Therapist        Occupational Therapy Education                 Title: PT OT SLP Therapies (Done)     Topic: Occupational Therapy (Done)     Point: ADL training (Done)     Description:   Instruct learner(s) on proper safety adaptation and remediation techniques during self care or transfers.   Instruct in proper use of assistive devices.              Learning Progress Summary           Patient Acceptance, E,TB, VU by  at 6/8/2021 1329   Family Acceptance, E,TB, VU by  at 6/8/2021 1329                   Point: Home exercise program (Done)     Description:   Instruct learner(s) on appropriate technique for monitoring, assisting and/or progressing therapeutic exercises/activities.              Learning Progress Summary           Patient Acceptance, E,TB, VU by  at 6/8/2021 1329   Family Acceptance, E,TB, VU by  at  6/8/2021 1329                   Point: Precautions (Done)     Description:   Instruct learner(s) on prescribed precautions during self-care and functional transfers.              Learning Progress Summary           Patient Acceptance, E,TB, VU by  at 6/8/2021 1329   Family Acceptance, E,TB, VU by LF at 6/8/2021 1329                   Point: Body mechanics (Done)     Description:   Instruct learner(s) on proper positioning and spine alignment during self-care, functional mobility activities and/or exercises.              Learning Progress Summary           Patient Acceptance, E,TB, VU by LF at 6/8/2021 1329   Family Acceptance, E,TB, VU by LF at 6/8/2021 1329                               User Key     Initials Effective Dates Name Provider Type Discipline     03/31/21 -  Rachael Ellison OT Occupational Therapist OT              OT Recommendation and Plan  Planned Therapy Interventions (OT): activity tolerance training, patient/caregiver education/training, BADL retraining, strengthening exercise, occupation/activity based interventions, functional balance retraining, transfer/mobility retraining  Therapy Frequency (OT): 5 times/wk  Plan of Care Review  Plan of Care Reviewed With: patient, daughter (Daughter in-law)  Progress: improving  Outcome Summary: Patient was agreeable to upper extremity exercises, demonstrating poor+ activity tolerance.     Time Calculation:   Time Calculation- OT     Row Name 06/09/21 1557             Time Calculation- OT    OT Received On  06/09/21  -LF         Timed Charges    95989 - OT Therapeutic Exercise Minutes  10  -LF         Total Minutes    Timed Charges Total Minutes  10  -LF       Total Minutes  10  -LF        User Key  (r) = Recorded By, (t) = Taken By, (c) = Cosigned By    Initials Name Provider Type     Rachael Ellison OT Occupational Therapist        Therapy Charges for Today     Code Description Service Date Service Provider Modifiers Qty    35230788716  OT EVAL  LOW COMPLEXITY 3 6/8/2021 Rachael Ellison, OT GO 1    [id number]  OT THER PROC EA 15 MIN 6/9/2021 Rachael Ellison OT GO 1               Rachael Ellison OT  6/9/2021

## 2021-06-09 NOTE — DISCHARGE PLACEMENT REQUEST
"Elsa Jensen (85 y.o. Female)     Date of Birth Social Security Number Address Home Phone MRN    1935  [address] [place] [address] [address] [place] [address] [phone] [id number]    Jewish Marital Status          [employer]        Admission Date Admission Type Admitting Provider Attending Provider Department, Room/Bed    6/6/21 Emergency Kerrie Rivera DO Moulana, Vaseem, MD [facility] 4TH FLOOR MEDICAL TELEMETRY UNIT, 422/1    Discharge Date Discharge Disposition Discharge Destination                       Attending Provider: Joaquin Oliveira MD    Allergies: Influenza Vaccines, Megestrol    Isolation: None   Infection: None   Code Status: CPR    Ht: 139.7 cm (55\")   Wt: 32.1 kg (70 lb 12.3 oz)    Admission Cmt: None   Principal Problem: COPD with hypoxia (CMS/[facility]) [J44.9,R09.02]                 Active Insurance as of 6/6/2021     Primary Coverage     Payor Plan Insurance Group Employer/Plan Group    HUMANA MEDICARE REPLACEMENT HUMANA MEDICARE REPLACEMENT [id number]     Payor Plan Address Payor Plan Phone Number Payor Plan Fax Number Effective Dates    [address] [address] [phone]  1/1/2018 - None Entered    [facility] [id number]       Subscriber Name Subscriber Birth Date Member ID       ELSA JENSEN 1935 [id number]                 Emergency Contacts      (Rel.) Home Phone Work Phone Mobile Phone    CUCA JENSEN (Son) [phone] -- [phone]    DESTINY JUAN (Son) [phone] -- [phone]              "

## 2021-06-09 NOTE — PLAN OF CARE
Goal Outcome Evaluation:  Plan of Care Reviewed With: patient           Outcome Summary: Patient presents with decreased trunk, transfers, and functional mobility.  She will benefit from skilled physical therapy services to address those deficits.  Therapist recommends discharge to rehab facility.

## 2021-06-09 NOTE — THERAPY EVALUATION
Acute Care - Physical Therapy Initial Evaluation  ANGELINE Ann     Patient Name: Elsa Terry  : 1935  MRN: [id number]  Today's Date: 2021           PT Assessment (last 12 hours)      PT Evaluation and Treatment     Row Name 21 1346          Physical Therapy Time and Intention    Subjective Information  no complaints  -DP     Document Type  evaluation  -DP     Mode of Treatment  individual therapy;physical therapy  -DP     Row Name 21 1346          General Information    Patient Profile Reviewed  yes  -DP     Prior Level of Function  independent:;bed mobility;transfer;gait  -DP     Equipment Currently Used at Home  walker, rolling  -DP     Existing Precautions/Restrictions  fall  -DP     Row Name 21 1346          Living Environment    Current Living Arrangements  home/apartment/condo  -DP     Home Accessibility  stairs to enter home  -DP     Lives With  spouse  -DP     Row Name 21 1346          Home Main Entrance    Number of Stairs, Main Entrance  three  -DP     Row Name 21 1346          Home Use of Assistive/Adaptive Equipment    Equipment Currently Used at Home  walker, rolling  -DP     Row Name 21 1346          Range of Motion (ROM)    Range of Motion  ROM is WFL  -DP     Row Name 21 1346          Strength (Manual Muscle Testing)    Strength (Manual Muscle Testing)  bilateral lower extremities 4-/5  -DP     Row Name 21 1346          Bed Mobility    Bed Mobility  supine-sit-supine  -DP     Supine-Sit-Supine Wagoner (Bed Mobility)  minimum assist (75% patient effort)  -DP     Row Name 21 1346          Transfers    Transfers  sit-stand transfer  -DP     Sit-Stand [place]er (Transfers)  minimum assist (75% patient effort)  -DP     Row Name 21 1346          Gait/Stairs (Locomotion)    Wagoner Level (Gait)  minimum assist (75% patient effort)  -DP     Assistive Device (Gait)  walker, front-wheeled  -DP     Distance in Feet (Gait)  10   -DP     Row Name 06/09/21 1346          Balance    Balance Assessment  standing dynamic balance  -DP     Static Sitting Balance  mild impairment  -DP     Row Name 06/09/21 1346          Plan of Care Review    Plan of Care Reviewed With  patient  -DP     Outcome Summary  Patient presents with decreased trunk, transfers, and functional mobility.  She will benefit from skilled physical therapy services to address those deficits.  Therapist recommends discharge to rehab facility.  -DP     Row Name 06/09/21 1346          Therapy Assessment/Plan (PT)    Rehab Potential (PT)  good, to achieve stated therapy goals  -DP     Row Name 06/09/21 1346          PT Evaluation Complexity    History, PT Evaluation Complexity  no personal factors and/or comorbidities  -DP     Examination of Body Systems (PT Eval Complexity)  total of 4 or more elements  -DP     Clinical Presentation (PT Evaluation Complexity)  stable  -DP     Clinical Decision Making (PT Evaluation Complexity)  low complexity  -DP     Overall Complexity (PT Evaluation Complexity)  low complexity  -DP       User Key  (r) = Recorded By, (t) = Taken By, (c) = Cosigned By    Initials Name Provider Type    Jeromy Lebron, PT Physical Therapist        Physical Therapy Education                 Title: PT OT SLP Therapies (Done)     Topic: Physical Therapy (Done)     Point: Mobility training (Done)     Learning Progress Summary           Patient Acceptance, E,TB, VU by DP at 6/9/2021 1406                   Point: Home exercise program (Done)     Learning Progress Summary           Patient Acceptance, E,TB, VU by DP at 6/9/2021 1406                   Point: Body mechanics (Done)     Learning Progress Summary           Patient Acceptance, E,TB, VU by DP at 6/9/2021 1406                   Point: Precautions (Done)     Learning Progress Summary           Patient Acceptance, E,TB, VU by DP at 6/9/2021 1406                               User Key     Initials Effective Dates  Name Provider Type Discipline    DP 06/03/21 -  Jeromy Rivera PT Physical Therapist PT              PT Recommendation and Plan  Anticipated Discharge Disposition (PT): skilled nursing facility, inpatient rehabilitation facility, sub acute care setting  Planned Therapy Interventions (PT): balance training, bed mobility training, gait training, strengthening, transfer training  Therapy Frequency (PT): daily  Plan of Care Reviewed With: patient  Outcome Summary: Patient presents with decreased trunk, transfers, and functional mobility.  She will benefit from skilled physical therapy services to address those deficits.  Therapist recommends discharge to rehab facility.  Outcome Measures     Row Name 06/09/21 1400             How much help from another person do you currently need...    Turning from your back to your side while in flat bed without using bedrails?  3  -DP      Moving from lying on back to sitting on the side of a flat bed without bedrails?  3  -DP      Moving to and from a bed to a chair (including a wheelchair)?  3  -DP      Standing up from a chair using your arms (e.g., wheelchair, bedside chair)?  3  -DP      Climbing 3-5 steps with a railing?  2  -DP      To walk in hospital room?  2  -DP      AM-PAC 6 Clicks Score (PT)  16  -DP         Functional Assessment    Outcome Measure Options  AM-PAC 6 Clicks Basic Mobility (PT)  -DP        User Key  (r) = Recorded By, (t) = Taken By, (c) = Cosigned By    Initials Name Provider Type    DP Jeromy Rivera PT Physical Therapist           Time Calculation:   PT Charges     Row Name 06/09/21 1407             Time Calculation    PT Received On  06/09/21  -DP      PT Goal Re-Cert Due Date  06/18/21  -DP         Untimed Charges    PT Eval/Re-eval Minutes  45  -DP         Total Minutes    Untimed Charges Total Minutes  45  -DP       Total Minutes  45  -DP        User Key  (r) = Recorded By, (t) = Taken By, (c) = Cosigned By    Initials Name Provider Type    PAYTON  Jeromy Rivera, PT Physical Therapist        Therapy Charges for Today     Code Description Service Date Service Provider Modifiers Qty    16207722217 HC PT EVAL LOW COMPLEXITY 3 6/9/2021 Jeromy Rivera, PT GP 1          PT G-Codes  Outcome Measure Options: AM-PAC 6 Clicks Basic Mobility (PT)  AM-PAC 6 Clicks Score (PT): 16  AM-PAC 6 Clicks Score (OT): 15    Jeromy Rivera, PT  6/9/2021

## 2021-06-09 NOTE — SIGNIFICANT NOTE
06/09/21 1132   Plan   Plan Comments MAX followed up with pt at bedside. Pt is currently asleep, DIL is at bedside. ULISES states that pts  is admitted here in the hospital. Pts  assisted with pt while at home, now pts  is not able to at this time. ULISES states that pt will need inpt rehab at discharge. [facility]ES prefers [facility], but agreeable to local referrals. SW sent referrals.

## 2021-06-09 NOTE — PLAN OF CARE
Goal Outcome Evaluation:  Plan of Care Reviewed With: patient        Progress: improving  Outcome Summary: no changes overnight

## 2021-06-09 NOTE — PROGRESS NOTES
[facility]     Progress Note    Patient Name: Elsa Terry  : 1935  MRN: [id number]  Primary Care Physician:  Joaquin Oliveira MD  Date of admission: 2021    Subjective   Subjective     Chief Complaint: BiPAP discontinued   3 L nasal oxygen saturation 97%  No bowel movement last night  No acute respiratory distress at this time  Able to speak full sentences    HPI:  Patient Reports Patient Reports no acute respiratory complaints at this time  Appetite fair with poor nutritional status    Review of Systems   Denies any fever feels fatigue weight loss which she cannot quantitate  HEENT: Bilateral lens implants no nasal congestion no epistaxis  Respiratory system: Scoliotic spine with good air entry bilaterally with decreased sounds in the bases wheezing on and off  Cardiovascular system: Regular rhythm no chest pain or palpitations noted  Gastrointestinal system: No abdominal pain nausea vomiting or constipation at this time  Genitourinary system: Denies dysuria hesitancy  Musculoskeletal system: Generalized weakness bilaterally  Endocrine: Easily fatigues  Hematology: No bleeding bruising or swollen glands  Psychiatric: History of anxiety  Neurologic: Easily arousable and can communicate with full sentences generalized weakness bilaterally  Skin: No edema or rash noted       Objective   Objective     Vitals:   Temp:  [97.3 °F (36.3 °C)-98.4 °F (36.9 °C)] 98.4 °F (36.9 °C)  Heart Rate:  [77-94] 77  Resp:  [18-26] 18  BP: (103-131)/(50-86) 120/50  Flow (L/min):  [3-10] 3  Physical Exam:   Constitutional: Awake, alert   Eyes: PERRLA, sclerae anicteric, no conjunctival injection   HENT: NCAT, mucous membranes moist   Neck: Supple, no thyromegaly, no lymphadenopathy, trachea midline   Respiratory: Clear to auscultation bilaterally, nonlabored respirations    Cardiovascular: RRR, no murmurs, rubs, or gallops, palpable pedal pulses bilaterally   Gastrointestinal: Positive bowel sounds, soft, nontender,  nondistended   Musculoskeletal: No bilateral ankle edema, no clubbing or cyanosis to extremities   Psychiatric: Appropriate affect, cooperative   Neurologic: Oriented x 3, strength symmetric in all extremities, Cranial Nerves grossly intact to confrontation, speech clear   Skin: No rashes    Result Review    Result Review:  I have personally reviewed the results from the time of this admission to 6/9/2021 05:52 EDT and agree with these findings:  []  Laboratory  [x]  Microbiology  []  Radiology  []  EKG/Telemetry   []  Cardiology/Vascular   []  Pathology  []  Old records  []  Other:  Most notable findings include: 6/8/2021 sputum shows gram-positive cocci  2 sets of blood cultures so far no growth  Assessment/Plan   Assessment / Plan     Brief Patient Summary:  Elsa Terry is a 85 y.o. female None clinically significant  On 3 L nasal oxygen saturation is 97%  Active Hospital Problems:  Active Hospital Problems    Diagnosis    • **COPD with hypoxia (CMS/HCC)    • Mass of upper lobe of right lung    • Abnormal pleural fluid      Small right pleural fluid     • Nicotine dependence    • Impaired attitude towards nutritional status    • Pneumonia due to infectious organism    • HTN (hypertension)      Plan: COPD with hypoxia (CMS/HCC): Continue duo nebs Brovana and Pulmicort  On 3 L nasal oxygen saturation 97%  BiPAP discontinued    Mass of upper lobe of right lung: consider diagnostic procedure when stable       Abnormal pleural fluid: small right pleural fluid will tap when symptomatic currently no active intervention at this time       Nicotine dependence: Continue Habitrol 21 mg patch daily      Impaired attitude towards nutritional status: Encourage nutrition continue Marinol orally      Pneumonia due to infectious organism: Sputum for Gram stain cultures and sensitivity  Continue with IV Zosyn empirically       HTN (hypertension): Continue losartan       DVT prophylaxis:  Medical DVT prophylaxis orders are  present.    CODE STATUS:   Level Of Support Discussed With: Patient  Code Status: CPR  Medical Interventions (Level of Support Prior to Arrest): Full    Disposition: Per Primary MD.    Jonathan Oliveira MD

## 2021-06-10 ENCOUNTER — APPOINTMENT (OUTPATIENT)
Dept: GENERAL RADIOLOGY | Facility: HOSPITAL | Age: 86
End: 2021-06-10

## 2021-06-10 PROBLEM — E43 SEVERE MALNUTRITION (HCC): Status: ACTIVE | Noted: 2021-06-10

## 2021-06-10 LAB
BACTERIA SPEC RESP CULT: NORMAL
GRAM STN SPEC: NORMAL
GRAM STN SPEC: NORMAL

## 2021-06-10 PROCEDURE — 92526 ORAL FUNCTION THERAPY: CPT

## 2021-06-10 PROCEDURE — 25010000002 CEFTRIAXONE PER 250 MG: Performed by: INTERNAL MEDICINE

## 2021-06-10 PROCEDURE — 63710000001 DRONABINOL PER 2.5 MG: Performed by: HOSPITALIST

## 2021-06-10 PROCEDURE — 63710000001 PREDNISONE PER 1 MG: Performed by: HOSPITALIST

## 2021-06-10 PROCEDURE — 94668 MNPJ CHEST WALL SBSQ: CPT

## 2021-06-10 PROCEDURE — 94799 UNLISTED PULMONARY SVC/PX: CPT

## 2021-06-10 PROCEDURE — 71046 X-RAY EXAM CHEST 2 VIEWS: CPT

## 2021-06-10 PROCEDURE — 97530 THERAPEUTIC ACTIVITIES: CPT

## 2021-06-10 RX ADMIN — DRONABINOL 5 MG: 2.5 CAPSULE ORAL at 21:04

## 2021-06-10 RX ADMIN — ARFORMOTEROL TARTRATE 15 MCG: 15 SOLUTION RESPIRATORY (INHALATION) at 19:59

## 2021-06-10 RX ADMIN — APIXABAN 2.5 MG: 2.5 TABLET, FILM COATED ORAL at 08:33

## 2021-06-10 RX ADMIN — ARFORMOTEROL TARTRATE 15 MCG: 15 SOLUTION RESPIRATORY (INHALATION) at 07:35

## 2021-06-10 RX ADMIN — DOCUSATE SODIUM 50MG AND SENNOSIDES 8.6MG 2 TABLET: 8.6; 5 TABLET, FILM COATED ORAL at 21:04

## 2021-06-10 RX ADMIN — CEFTRIAXONE 1 G: 10 INJECTION, POWDER, FOR SOLUTION INTRAVENOUS at 21:04

## 2021-06-10 RX ADMIN — IPRATROPIUM BROMIDE AND ALBUTEROL SULFATE 3 ML: .5; 2.5 SOLUTION RESPIRATORY (INHALATION) at 12:14

## 2021-06-10 RX ADMIN — FAMOTIDINE 40 MG: 20 TABLET, FILM COATED ORAL at 08:33

## 2021-06-10 RX ADMIN — IPRATROPIUM BROMIDE AND ALBUTEROL SULFATE 3 ML: .5; 2.5 SOLUTION RESPIRATORY (INHALATION) at 19:59

## 2021-06-10 RX ADMIN — LOSARTAN POTASSIUM 100 MG: 50 TABLET, FILM COATED ORAL at 08:33

## 2021-06-10 RX ADMIN — APIXABAN 2.5 MG: 2.5 TABLET, FILM COATED ORAL at 21:04

## 2021-06-10 RX ADMIN — BUDESONIDE 0.5 MG: 0.5 INHALANT ORAL at 19:59

## 2021-06-10 RX ADMIN — PREDNISONE 40 MG: 20 TABLET ORAL at 08:34

## 2021-06-10 RX ADMIN — SODIUM CHLORIDE, PRESERVATIVE FREE 10 ML: 5 INJECTION INTRAVENOUS at 08:38

## 2021-06-10 RX ADMIN — BUDESONIDE 0.5 MG: 0.5 INHALANT ORAL at 07:35

## 2021-06-10 RX ADMIN — SODIUM CHLORIDE, PRESERVATIVE FREE 10 ML: 5 INJECTION INTRAVENOUS at 21:14

## 2021-06-10 RX ADMIN — DRONABINOL 5 MG: 2.5 CAPSULE ORAL at 08:34

## 2021-06-10 RX ADMIN — METOPROLOL SUCCINATE 25 MG: 25 TABLET, EXTENDED RELEASE ORAL at 08:33

## 2021-06-10 RX ADMIN — IPRATROPIUM BROMIDE AND ALBUTEROL SULFATE 3 ML: .5; 2.5 SOLUTION RESPIRATORY (INHALATION) at 07:35

## 2021-06-10 RX ADMIN — GUAIFENESIN 600 MG: 600 TABLET, EXTENDED RELEASE ORAL at 21:04

## 2021-06-10 RX ADMIN — DOCUSATE SODIUM 50MG AND SENNOSIDES 8.6MG 2 TABLET: 8.6; 5 TABLET, FILM COATED ORAL at 08:34

## 2021-06-10 RX ADMIN — GUAIFENESIN 600 MG: 600 TABLET, EXTENDED RELEASE ORAL at 08:34

## 2021-06-10 NOTE — PLAN OF CARE
Goal Outcome Evaluation:  Plan of Care Reviewed With: patient        Progress: improving  Outcome Summary: no

## 2021-06-10 NOTE — PROGRESS NOTES
[facility]     Progress Note    Patient Name: Elsa Terry  : 1935  MRN: [id number]  Primary Care Physician:  Joaquin Oliveira MD  Date of admission: 2021    Subjective   Subjective     Chief Complaint: On room air saturation 96%   Had 1 bowel movement yesterday  Able to speak full sentences  No temperature spike  Cough nonproductive  Not in apparent respiratory distress    HPI:  Patient Reports no acute respiratory complaints at this time  Appetite fair with poor nutritional status    Review of Systems Denies any fever feels fatigued weight loss which she cannot quantitate  HEENT: Bilateral lens implants no nasal congestion no epistaxis  Respiratory system: Scoliotic spine with good air entry bilaterally with decreased sounds in the bases wheezing on and off  Cardiovascular system: Regular rhythm no chest pain or palpitations noted  Gastrointestinal system: No abdominal pain nausea vomiting or constipation at this time  Genitourinary system: Denies dysuria hesitancy  Musculoskeletal system: Generalized weakness bilaterally  Endocrine: Easily fatigues  Hematology: No bleeding bruising or swollen glands  Psychiatric: History of anxiety  Neurologic: Easily arousable and can communicate with full sentences generalized weakness bilaterally  Skin: No edema or rash noted       Objective   Objective     Vitals:   Temp:  [97.7 °F (36.5 °C)-98.2 °F (36.8 °C)] 97.9 °F (36.6 °C)  Heart Rate:  [67-84] 81  Resp:  [18-20] 20  BP: (122-156)/(50-75) 152/68  Flow (L/min):  [1] 1  Physical Exam:   Constitutional: Awake, alert   Eyes: PERRLA, sclerae anicteric, no conjunctival injection   HENT: NCAT, mucous membranes moist   Neck: Supple, no thyromegaly, no lymphadenopathy, trachea midline   Respiratory: Clear to auscultation bilaterally, nonlabored respirations    Cardiovascular: RRR, no murmurs, rubs, or gallops, palpable pedal pulses bilaterally   Gastrointestinal: Positive bowel sounds, soft, nontender,  nondistended   Musculoskeletal: No bilateral ankle edema, no clubbing or cyanosis to extremities   Psychiatric: Appropriate affect, cooperative   Neurologic: Oriented x 3, strength symmetric in all extremities, Cranial Nerves grossly intact to confrontation, speech clear   Skin: No rashes    Result Review    Result Review:  I have personally reviewed the results from the time of this admission to 6/10/2021 05:53 EDT and agree with these findings:  []  Laboratory  []  Microbiology  []  Radiology  []  EKG/Telemetry   []  Cardiology/Vascular   []  Pathology  []  Old records  []  Other:  Most notable findings include: Off BiPAP and nasal oxygen on room air saturation 96%    Assessment/Plan   Assessment / Plan     Brief Patient Summary:  Elsa Terry is a 85 y.o. female who normal respiratory sanket on sputum examination  On room air saturation maintained at 96%    Active Hospital Problems:  Active Hospital Problems    Diagnosis    • **COPD with hypoxia (CMS/HCC)    • Mass of upper lobe of right lung    • Abnormal pleural fluid      Small right pleural fluid     • Nicotine dependence    • Impaired attitude towards nutritional status    • Pneumonia due to infectious organism    • HTN (hypertension)      Plan: COPD with hypoxia (CMS/HCC): Continue duo nebs Brovana and Pulmicort  On 3 L nasal oxygen saturation 97%  BiPAP discontinued     Mass of upper lobe of right lung: consider diagnostic procedure when stable                 Abnormal pleural fluid: small right pleural fluid will tap when symptomatic currently no active intervention at this time                 Nicotine dependence: Continue Habitrol 21 mg patch daily                 Impaired attitude towards nutritional status: Encourage nutrition continue Marinol orally          Pneumonia due to infectious organism: Sputum for Gram stain cultures and sensitivity  Continue with IV Zosyn empirically          HTN (hypertension): Continue losartan       DVT  prophylaxis:  Medical DVT prophylaxis orders are present.    CODE STATUS:   Level Of Support Discussed With: Patient  Code Status: CPR  Medical Interventions (Level of Support Prior to Arrest): Full    Disposition: Per Primary MD.    Jonathan Oliveira MD

## 2021-06-10 NOTE — THERAPY TREATMENT NOTE
Acute Care - Physical Therapy Treatment Note   Ann     Patient Name: Elsa Terry  : 1935  MRN: [id number]  Today's Date: 6/10/2021           PT Assessment (last 12 hours)      PT Evaluation and Treatment     Row Name 06/10/21 1100          Physical Therapy Time and Intention    Subjective Information  no complaints  -CS     Document Type  therapy note (daily note)  -CS     Mode of Treatment  individual therapy  -CS     Total Minutes, Physical Therapy  16  -CS     Patient Effort  good  -CS     Row Name 06/10/21 1100          Bed Mobility    Bed Mobility  supine-sit  -CS     Supine-Sit [place] (Bed Mobility)  minimum assist (75% patient effort)  -CS     Supine-Sit-Supine [place] (Bed Mobility)  minimum assist (75% patient effort)  -CS     Assistive Device (Bed Mobility)  bed rails;head of bed elevated  -CS     Row Name 06/10/21 1100          Transfers    Transfers  sit-stand transfer  -CS     Sit-Stand [place] (Transfers)  minimum assist (75% patient effort)  -CS     Row Name 06/10/21 1100          Sit-Stand Transfer    Assistive Device (Sit-Stand Transfers)  walker, front-wheeled  -CS     Row Name 06/10/21 1100          Gait/Stairs (Locomotion)    Gait/Stairs Locomotion  gait/ambulation assistive device  -CS     [place] Level (Gait)  contact guard  -CS     Assistive Device (Gait)  walker, front-wheeled  -CS     Distance in Feet (Gait)  50  -CS     Pattern (Gait)  step-to  -CS     Row Name 06/10/21 1100          Balance    Balance Assessment  standing dynamic balance  -CS     Sit to Stand Dynamic Balance  mild impairment  -CS     Dynamic Standing Balance  mild impairment  -CS     Row Name 06/10/21 1100          Therapy Assessment/Plan (PT)    PT Diagnosis (PT)  Difficulty walking  -CS     Rehab Potential (PT)  good, to achieve stated therapy goals  -CS     Criteria for Skilled Interventions Met (PT)  yes  -CS     Problem List (PT)  strength;balance;mobility  -CS     Row Name  06/10/21 1100          Progress Summary (PT)    Progress Toward Functional Goals (PT)  progress toward functional goals is good  -CS       User Key  (r) = Recorded By, (t) = Taken By, (c) = Cosigned By    Initials Name Provider Type    CS Heather Cabezas, PT Physical Therapist        Physical Therapy Education                 Title: PT OT SLP Therapies (Done)     Topic: Physical Therapy (Done)     Point: Mobility training (Done)     Learning Progress Summary           Patient Acceptance, E,TB, VU by DP at 6/9/2021 1406                   Point: Home exercise program (Done)     Learning Progress Summary           Patient Acceptance, E,TB, VU by DP at 6/9/2021 1406                   Point: Body mechanics (Done)     Learning Progress Summary           Patient Acceptance, E,TB, VU by DP at 6/9/2021 1406                   Point: Precautions (Done)     Learning Progress Summary           Patient Acceptance, E,TB, VU by DP at 6/9/2021 1406                               User Key     Initials Effective Dates Name Provider Type Discipline    DP 06/03/21 -  Jeromy Rivera PT Physical Therapist PT              PT Recommendation and Plan  Planned Therapy Interventions (PT): balance training, gait training, strengthening, transfer training  Therapy Frequency (PT): daily  Progress Summary (PT)  Progress Toward Functional Goals (PT): progress toward functional goals is good  Outcome Measures     Row Name 06/09/21 1400             How much help from another person do you currently need...    Turning from your back to your side while in flat bed without using bedrails?  3  -DP      Moving from lying on back to sitting on the side of a flat bed without bedrails?  3  -DP      Moving to and from a bed to a chair (including a wheelchair)?  3  -DP      Standing up from a chair using your arms (e.g., wheelchair, bedside chair)?  3  -DP      Climbing 3-5 steps with a railing?  2  -DP      To walk in hospital room?  2  -DP      AM-PAC 6  Clicks Score (PT)  16  -DP         Functional Assessment    Outcome Measure Options  AM-PAC 6 Clicks Basic Mobility (PT)  -DP        User Key  (r) = Recorded By, (t) = Taken By, (c) = Cosigned By    Initials Name Provider Type    Jeromy Lebron, PT Physical Therapist           Time Calculation:   PT Charges     Row Name 06/10/21 1157             Time Calculation    PT Received On  06/09/21  -CS      PT Goal Re-Cert Due Date  06/18/21  -CS         Timed Charges    46045 - PT Therapeutic Activity Minutes  16  -CS         Total Minutes    Timed Charges Total Minutes  16  -CS       Total Minutes  16  -CS        User Key  (r) = Recorded By, (t) = Taken By, (c) = Cosigned By    Initials Name Provider Type    CS Heather Cabezas, PT Physical Therapist        Therapy Charges for Today     Code Description Service Date Service Provider Modifiers Qty    73682258921 HC PT THERAPEUTIC ACT EA 15 MIN 6/10/2021 Heather Cabezas, PT GP 1          PT G-Codes  Outcome Measure Options: AM-PAC 6 Clicks Daily Activity (OT), Optimal Instrument  AM-PAC 6 Clicks Score (PT): 17  AM-PAC 6 Clicks Score (OT): 15    Heather Cabezas PT  6/10/2021

## 2021-06-10 NOTE — THERAPY TREATMENT NOTE
Acute Care - Speech Language Pathology   Swallow Treatment Note ANGELINE Ann     Patient Name: Elsa Terry  : 1935  MRN: [id number]  Today's Date: 6/10/2021               Admit Date: 2021    Visit Dx:     ICD-10-CM ICD-9-CM   1. Pneumonia due to infectious organism, unspecified laterality, unspecified part of lung  J18.9 486   2. Lung mass  R91.8 786.6   3. Hypoxia  R09.02 799.02   4. Oropharyngeal dysphagia  R13.12 787.22   5. Decreased activities of daily living (ADL)  Z78.9 V49.89   6. Difficulty walking  R26.2 719.7     Patient Active Problem List   Diagnosis   • Pneumonia due to infectious organism   • HTN (hypertension)   • Mass of upper lobe of right lung   • Abnormal pleural fluid   • COPD with hypoxia (CMS/HCC)   • Nicotine dependence   • Impaired attitude towards nutritional status     Past Medical History:   Diagnosis Date   • Abnormal pleural fluid    • Colon cancer (CMS/HCC)    • COPD (chronic obstructive pulmonary disease) (CMS/HCC)    • DVT (deep venous thrombosis) (CMS/HCC)    • Elevated cholesterol    • Hypertension    • Mass of upper lobe of right lung    • Osteoporosis    • Pneumonia      Past Surgical History:   Procedure Laterality Date   • BREAST SURGERY      Lumpectomy   • CATARACT EXTRACTION     • COLON SURGERY     • HERNIA REPAIR     • HIP ARTHROPLASTY     • JOINT REPLACEMENT      Right Hip ORIF approx    • TONSILLECTOMY     • TUBAL ABDOMINAL LIGATION     SPEECH PATHOLOGY DYSPHAGIA TREATMENT  Subjective/Behavioral Observations: Patient alert and cooperative  Current Diet:  Mechanical soft diet-thin liquids  Current Strategies: Small bites/drinks, 90 degrees upright, encourage frequent rest periods of short of breath  Treatment received:  Patient tolerating soft solids well without signs or symptoms of aspiration.  No oral residue noted after the swallow.  Timely oral transit.  Swallow completed with thin liquids without clinical signs or symptoms of aspiration with  control trials.  Patient dgt at bedside.  Education regarding current diet and safe swallow strategies primarily focusing on proper positioning and s/s of aspiration.    Results of treatment: Tolerates mechanical soft and thin liquids without clinical signs or symptoms of aspiration    Progress toward goals: Progressing    Barriers to Achieving goals: Medical status    Plan of care:/changes in plan: Mechanical soft diet, extra sauce/gravy to each tray, thin liquids.   No further dysphagia tx indicated at this time.   Will discharge from ST services.    Available for re-consult should patient medical status warrant.       EDUCATION  The patient has been educated in the following areas:   Dysphagia (Swallowing Impairment).        Marielle Valle, SLP  6/10/2021

## 2021-06-10 NOTE — PROGRESS NOTES
[facility]     Progress Note    Patient Name: Elsa Terry  : 1935  MRN: [id number]  Primary Care Physician:  Joaquin Oliveira MD  Date of admission: 2021    Subjective   Subjective     Chief Complaint: dyspnea and hypoxia    HPI:  Patient Reports breathing is better    Review of Systems   Review of Systems   Constitutional: Positive for activity change (walked with therapy).   Respiratory: Negative for shortness of breath.    Cardiovascular: Negative for leg swelling.   Genitourinary: Negative for difficulty urinating.   Neurological: Positive for weakness.       Objective   Objective     Vitals:   Temp:  [97.6 °F (36.4 °C)-98.2 °F (36.8 °C)] 97.6 °F (36.4 °C)  Heart Rate:  [66-84] 79  Resp:  [18-20] 20  BP: (122-159)/(51-75) 148/56  Physical Exam    Constitutional: Awake, alert ,sitting up in bed   Eyes: PERRLA, sclerae anicteric, no conjunctival injection   HENT: NCAT, mucous membranes moist   Neck: Supple, no thyromegaly, no lymphadenopathy, trachea midline   Respiratory: Clear to auscultation bilaterally, nonlabored respirations ,decreased breath sounds   Cardiovascular: RRR, no murmurs, rubs, or gallops, palpable pedal pulses bilaterally   Gastrointestinal: Positive bowel sounds, soft, nontender, nondistended   Musculoskeletal: No bilateral ankle edema, no clubbing or cyanosis to extremities   Psychiatric: Appropriate affect, cooperative   Neurologic: Oriented x 3, strength symmetric in all extremities, Cranial Nerves grossly intact to confrontation, speech clear   Skin: No rashes     Result Review    Result Review:  I have personally reviewed the results from the time of this admission to 6/10/2021 14:17 EDT and agree with these findings:  [x]  Laboratory  []  Microbiology  []  Radiology  []  EKG/Telemetry   []  Cardiology/Vascular   []  Pathology  []  Old records  []  Other:  Most notable findings include: lung mass/pneumonia    Assessment/Plan   Assessment / Plan     Brief Patient  Summary:  Elsa Terry is a 85 y.o. female who was admitted with dyspnea    Active Hospital Problems:  Active Hospital Problems    Diagnosis    • **COPD with hypoxia (CMS/HCC)    • Severe malnutrition (CMS/HCC)    • Mass of upper lobe of right lung    • Abnormal pleural fluid      Small right pleural fluid     • Nicotine dependence    • Impaired attitude towards nutritional status    • Pneumonia due to infectious organism    • HTN (hypertension)      Plan: Continue present antibiotics,breathing continues to improve. Repeat chest X Ray today. Family talking to Social service for Rehab.       DVT prophylaxis:  Medical DVT prophylaxis orders are present.    CODE STATUS:   Level Of Support Discussed With: Patient  Code Status: CPR  Medical Interventions (Level of Support Prior to Arrest): Full      Electronically signed by Joaquin Oliveira MD, 06/10/21, 2:17 PM EDT.

## 2021-06-10 NOTE — CONSULTS
SW following for discharge planning. Pt has bed offers for inpt rehab. SW will follow up with pt at bedside. DIL requesting [facility]. SW awaiting response from [facility]. Pts  admitted in hospital as well. SW will coordinate with pts  SW to discuss inpt rehab.

## 2021-06-10 NOTE — SIGNIFICANT NOTE
06/10/21 1456   Plan   Provided Post Acute Provider List? Yes   Post Acute Provider List Nursing Home   Delivered To Support Person   Support Person Daughter In Law-Laura   Method of Delivery In person   Plan Comments Pt has bed offers at [facility], [place], [place]. MAX discussed with pts DIL. DIL is preferring for pts  and pt to be sent to the same facility. SW discussed bed offers. DIL does not want [facility] due to location. MAX is collaborating with pts  SW to determine bed offers for pt and pts .

## 2021-06-11 PROBLEM — D50.9 IRON DEFICIENCY ANEMIA, UNSPECIFIED: Chronic | Status: ACTIVE | Noted: 2021-06-11

## 2021-06-11 LAB
ALBUMIN SERPL-MCNC: 3.2 G/DL (ref 3.5–5.2)
ALBUMIN/GLOB SERPL: 1.6 G/DL
ALP SERPL-CCNC: 57 U/L (ref 39–117)
ALT SERPL W P-5'-P-CCNC: 11 U/L (ref 1–33)
ANION GAP SERPL CALCULATED.3IONS-SCNC: 9.8 MMOL/L (ref 5–15)
AST SERPL-CCNC: 18 U/L (ref 1–32)
BACTERIA SPEC AEROBE CULT: NORMAL
BACTERIA SPEC AEROBE CULT: NORMAL
BASOPHILS # BLD AUTO: 0.01 10*3/MM3 (ref 0–0.2)
BASOPHILS NFR BLD AUTO: 0.1 % (ref 0–1.5)
BILIRUB SERPL-MCNC: 0.2 MG/DL (ref 0–1.2)
BUN SERPL-MCNC: 33 MG/DL (ref 8–23)
BUN/CREAT SERPL: 31.7 (ref 7–25)
CALCIUM SPEC-SCNC: 8.6 MG/DL (ref 8.6–10.5)
CHLORIDE SERPL-SCNC: 112 MMOL/L (ref 98–107)
CO2 SERPL-SCNC: 19.2 MMOL/L (ref 22–29)
CREAT SERPL-MCNC: 1.04 MG/DL (ref 0.57–1)
DEPRECATED RDW RBC AUTO: 48.9 FL (ref 37–54)
EOSINOPHIL # BLD AUTO: 0 10*3/MM3 (ref 0–0.4)
EOSINOPHIL NFR BLD AUTO: 0 % (ref 0.3–6.2)
ERYTHROCYTE [DISTWIDTH] IN BLOOD BY AUTOMATED COUNT: 14.7 % (ref 12.3–15.4)
GFR SERPL CREATININE-BSD FRML MDRD: 50 ML/MIN/1.73
GLOBULIN UR ELPH-MCNC: 2 GM/DL
GLUCOSE SERPL-MCNC: 85 MG/DL (ref 65–99)
HCT VFR BLD AUTO: 27 % (ref 34–46.6)
HGB BLD-MCNC: 8.2 G/DL (ref 12–15.9)
IMM GRANULOCYTES # BLD AUTO: 0.02 10*3/MM3 (ref 0–0.05)
IMM GRANULOCYTES NFR BLD AUTO: 0.3 % (ref 0–0.5)
LYMPHOCYTES # BLD AUTO: 2.43 10*3/MM3 (ref 0.7–3.1)
LYMPHOCYTES NFR BLD AUTO: 30.4 % (ref 19.6–45.3)
MCH RBC QN AUTO: 27.6 PG (ref 26.6–33)
MCHC RBC AUTO-ENTMCNC: 30.4 G/DL (ref 31.5–35.7)
MCV RBC AUTO: 90.9 FL (ref 79–97)
MONOCYTES # BLD AUTO: 0.41 10*3/MM3 (ref 0.1–0.9)
MONOCYTES NFR BLD AUTO: 5.1 % (ref 5–12)
NEUTROPHILS NFR BLD AUTO: 5.13 10*3/MM3 (ref 1.7–7)
NEUTROPHILS NFR BLD AUTO: 64.1 % (ref 42.7–76)
NRBC BLD AUTO-RTO: 0 /100 WBC (ref 0–0.2)
PLATELET # BLD AUTO: 189 10*3/MM3 (ref 140–450)
PMV BLD AUTO: 10.1 FL (ref 6–12)
POTASSIUM SERPL-SCNC: 3.9 MMOL/L (ref 3.5–5.2)
PROT SERPL-MCNC: 5.2 G/DL (ref 6–8.5)
RBC # BLD AUTO: 2.97 10*6/MM3 (ref 3.77–5.28)
SODIUM SERPL-SCNC: 141 MMOL/L (ref 136–145)
WBC # BLD AUTO: 8 10*3/MM3 (ref 3.4–10.8)

## 2021-06-11 PROCEDURE — 25010000002 CEFTRIAXONE PER 250 MG: Performed by: INTERNAL MEDICINE

## 2021-06-11 PROCEDURE — 85025 COMPLETE CBC W/AUTO DIFF WBC: CPT | Performed by: INTERNAL MEDICINE

## 2021-06-11 PROCEDURE — 94799 UNLISTED PULMONARY SVC/PX: CPT

## 2021-06-11 PROCEDURE — 25010000002 IRON SUCROSE PER 1 MG: Performed by: INTERNAL MEDICINE

## 2021-06-11 PROCEDURE — 97110 THERAPEUTIC EXERCISES: CPT

## 2021-06-11 PROCEDURE — 63710000001 DRONABINOL PER 2.5 MG: Performed by: HOSPITALIST

## 2021-06-11 PROCEDURE — 80053 COMPREHEN METABOLIC PANEL: CPT | Performed by: INTERNAL MEDICINE

## 2021-06-11 PROCEDURE — 63710000001 PREDNISONE PER 1 MG: Performed by: HOSPITALIST

## 2021-06-11 RX ADMIN — GUAIFENESIN 600 MG: 600 TABLET, EXTENDED RELEASE ORAL at 08:28

## 2021-06-11 RX ADMIN — CEFTRIAXONE 1 G: 10 INJECTION, POWDER, FOR SOLUTION INTRAVENOUS at 20:36

## 2021-06-11 RX ADMIN — METOPROLOL SUCCINATE 25 MG: 25 TABLET, EXTENDED RELEASE ORAL at 08:28

## 2021-06-11 RX ADMIN — ARFORMOTEROL TARTRATE 15 MCG: 15 SOLUTION RESPIRATORY (INHALATION) at 07:00

## 2021-06-11 RX ADMIN — IPRATROPIUM BROMIDE AND ALBUTEROL SULFATE 3 ML: .5; 2.5 SOLUTION RESPIRATORY (INHALATION) at 12:29

## 2021-06-11 RX ADMIN — BUDESONIDE 0.5 MG: 0.5 INHALANT ORAL at 07:00

## 2021-06-11 RX ADMIN — APIXABAN 2.5 MG: 2.5 TABLET, FILM COATED ORAL at 08:28

## 2021-06-11 RX ADMIN — ARFORMOTEROL TARTRATE 15 MCG: 15 SOLUTION RESPIRATORY (INHALATION) at 18:54

## 2021-06-11 RX ADMIN — DOCUSATE SODIUM 50MG AND SENNOSIDES 8.6MG 2 TABLET: 8.6; 5 TABLET, FILM COATED ORAL at 08:27

## 2021-06-11 RX ADMIN — PREDNISONE 40 MG: 20 TABLET ORAL at 08:28

## 2021-06-11 RX ADMIN — DOCUSATE SODIUM 50MG AND SENNOSIDES 8.6MG 2 TABLET: 8.6; 5 TABLET, FILM COATED ORAL at 20:29

## 2021-06-11 RX ADMIN — FAMOTIDINE 40 MG: 20 TABLET, FILM COATED ORAL at 08:28

## 2021-06-11 RX ADMIN — LOSARTAN POTASSIUM 100 MG: 50 TABLET, FILM COATED ORAL at 08:28

## 2021-06-11 RX ADMIN — IPRATROPIUM BROMIDE AND ALBUTEROL SULFATE 3 ML: .5; 2.5 SOLUTION RESPIRATORY (INHALATION) at 18:55

## 2021-06-11 RX ADMIN — IPRATROPIUM BROMIDE AND ALBUTEROL SULFATE 3 ML: .5; 2.5 SOLUTION RESPIRATORY (INHALATION) at 01:52

## 2021-06-11 RX ADMIN — SODIUM CHLORIDE, PRESERVATIVE FREE 10 ML: 5 INJECTION INTRAVENOUS at 20:30

## 2021-06-11 RX ADMIN — IPRATROPIUM BROMIDE AND ALBUTEROL SULFATE 3 ML: .5; 2.5 SOLUTION RESPIRATORY (INHALATION) at 07:00

## 2021-06-11 RX ADMIN — BUDESONIDE 0.5 MG: 0.5 INHALANT ORAL at 18:55

## 2021-06-11 RX ADMIN — IRON SUCROSE 400 MG: 20 INJECTION, SOLUTION INTRAVENOUS at 20:18

## 2021-06-11 RX ADMIN — SODIUM CHLORIDE, PRESERVATIVE FREE 10 ML: 5 INJECTION INTRAVENOUS at 08:42

## 2021-06-11 RX ADMIN — Medication 5 MG: at 20:29

## 2021-06-11 RX ADMIN — DRONABINOL 5 MG: 2.5 CAPSULE ORAL at 20:37

## 2021-06-11 RX ADMIN — GUAIFENESIN 600 MG: 600 TABLET, EXTENDED RELEASE ORAL at 20:37

## 2021-06-11 RX ADMIN — APIXABAN 2.5 MG: 2.5 TABLET, FILM COATED ORAL at 20:29

## 2021-06-11 NOTE — PROGRESS NOTES
[facility]     Progress Note    Patient Name: Elsa Terry  : 1935  MRN: [id number]  Primary Care Physician:  Joaquin Oliveira MD  Date of admission: 2021    Subjective   Subjective     Chief Complaint: dyspnea better    HPI:  Patient Reports breathing is better    Review of Systems   Review of Systems   Constitutional: Negative for fever. Activity change: ambulating with PT.   Respiratory: Positive for shortness of breath. Negative for cough.    Cardiovascular: Negative for chest pain and leg swelling.   Gastrointestinal: Negative for anal bleeding and blood in stool.   Genitourinary: Negative for hematuria.   Musculoskeletal: Positive for arthralgias.   Neurological: Positive for weakness.   Psychiatric/Behavioral: Positive for confusion.       Objective   Objective     Vitals:   Temp:  [97.7 °F (36.5 °C)-98.1 °F (36.7 °C)] 98.06 °F (36.7 °C)  Heart Rate:  [73-88] 88  Resp:  [16-20] 20  BP: (119-147)/(51-62) 119/51  Flow (L/min):  [3] 3  Physical Exam    Constitutional: Awake, alert   Eyes: PERRLA, sclerae anicteric, no conjunctival injection   HENT: NCAT, mucous membranes moist   Neck: Supple, no thyromegaly, no lymphadenopathy, trachea midline   Respiratory: Clear to auscultation bilaterally, nonlabored respirations    Cardiovascular: RRR, no murmurs, rubs, or gallops, palpable pedal pulses bilaterally   Gastrointestinal: Positive bowel sounds, soft, nontender, nondistended   Musculoskeletal: No bilateral ankle edema, no clubbing or cyanosis to extremities   Psychiatric: Appropriate affect, cooperative   Neurologic: Oriented x 3, strength symmetric in all extremities, Cranial Nerves grossly intact to confrontation, speech clear   Skin: No rashes     Result Review    Result Review:  I have personally reviewed the results from the time of this admission to 2021 19:00 EDT and agree with these findings:  [x]  Laboratory  []  Microbiology  []  Radiology  []  EKG/Telemetry   []   Cardiology/Vascular   []  Pathology  []  Old records  []  Other:  Most notable findings include:anemia and Iron deficiency. Lung mass present    Assessment/Plan   Assessment / Plan     Brief Patient Summary:  Elsa Terry is a 85 y.o. female who has Anemia and Iron deficiency. Dyspnea better. Persistent lung mass seen RUL.    Active Hospital Problems:  Active Hospital Problems    Diagnosis    • **COPD with hypoxia (CMS/HCC)    • Severe malnutrition (CMS/HCC)    • Mass of upper lobe of right lung    • Abnormal pleural fluid      Small right pleural fluid     • Nicotine dependence    • Impaired attitude towards nutritional status    • Pneumonia due to infectious organism    • HTN (hypertension)      Plan:    Will recheck CBC in AM. Will start her on IV Venofer. Social service is working on a Rehab bed. Continue ambulating with PT. Continue present treatment for COPD.    DVT prophylaxis:  Medical DVT prophylaxis orders are present.    CODE STATUS:   Level Of Support Discussed With: Patient  Code Status: CPR  Medical Interventions (Level of Support Prior to Arrest): Full      Electronically signed by Joaquin Oliveira MD, 06/11/21, 7:00 PM EDT.

## 2021-06-11 NOTE — PLAN OF CARE
Goal Outcome Evaluation:  Plan of Care Reviewed With: patient           Outcome Summary: Has difficulty understanding what a care plan is, and currently thinks she is at home

## 2021-06-11 NOTE — PLAN OF CARE
Goal Outcome Evaluation:   NO NEW COMPLAINTS. PT ALERT AND ORIENTED, NO EPISODES OF CONFUSION AT THIS TIME.

## 2021-06-11 NOTE — PROGRESS NOTES
[facility]     Progress Note    Patient Name: Elsa Terry  : 1935  MRN: [id number]  Primary Care Physician:  Joaquin Oliveira MD  Date of admission: 2021    Subjective   Subjective     Chief Complaint: On room air saturation 96%   No chest x-ray done  Blood cultures from 2021 after 4 days incubation no growth  Family seeking rehab placement  Able to speak full sentences  No temperature spike  Cough nonproductive  Not in apparent respiratory distress    HPI:  Patient Reports Patient Reports no acute respiratory complaints at this time  Appetite fair with poor nutritional status    Review of Systems Denies any fever feels fatigued weight loss which she cannot quantitate  HEENT: Bilateral lens implants no nasal congestion no epistaxis  Respiratory system: Scoliotic spine with good air entry bilaterally with decreased sounds in the bases wheezing on and off  Cardiovascular system: Regular rhythm no chest pain or palpitations noted  Gastrointestinal system: No abdominal pain nausea vomiting or constipation at this time  Genitourinary system: Denies dysuria hesitancy  Musculoskeletal system: Generalized weakness bilaterally  Endocrine: Easily fatigues  Hematology: No bleeding bruising or swollen glands  Psychiatric: History of anxiety  Neurologic: Easily arousable and can communicate with full sentences generalized weakness bilaterally  Skin: No edema or rash noted       Objective   Objective     Vitals:   Temp:  [97.6 °F (36.4 °C)-98.2 °F (36.8 °C)] 98.1 °F (36.7 °C)  Heart Rate:  [66-82] 81  Resp:  [16-20] 18  BP: (118-159)/(53-62) 138/62  Physical Exam:   Constitutional: Awake, alert   Eyes: PERRLA, sclerae anicteric, no conjunctival injection   HENT: NCAT, mucous membranes moist   Neck: Supple, no thyromegaly, no lymphadenopathy, trachea midline   Respiratory: Clear to auscultation bilaterally, nonlabored respirations    Cardiovascular: RRR, no murmurs, rubs, or gallops, palpable pedal pulses  bilaterally   Gastrointestinal: Positive bowel sounds, soft, nontender, nondistended   Musculoskeletal: No bilateral ankle edema, no clubbing or cyanosis to extremities   Psychiatric: Appropriate affect, cooperative   Neurologic: Oriented x 3, strength symmetric in all extremities, Cranial Nerves grossly intact to confrontation, speech clear   Skin: No rashes    Result Review    Result Review:  I have personally reviewed the results from the time of this admission to 6/11/2021 05:57 EDT and agree with these findings:  []  Laboratory  [x]  Microbiology  [x]  Radiology  []  EKG/Telemetry   []  Cardiology/Vascular   []  Pathology  []  Old records  []  Other:  Most notable findings include: 6/10/2021 chest x-ray shows a 2.3 cm spiculated mass in the right upper lobe, left lower lobe atelectasis versus airspace disease and enlarged heart  6/6/2021 2 sets of blood cultures after 4 days of intubation no growth    Assessment/Plan   Assessment / Plan     Brief Patient Summary:  Elsa Terry is a 85 y.o. female who normal respiratory sanket on sputum examination  On room air saturation maintained at 96% possible rehab placement being evaluated       Active Hospital Problems:  Active Hospital Problems    Diagnosis    • **COPD with hypoxia (CMS/HCC)    • Severe malnutrition (CMS/HCC)    • Mass of upper lobe of right lung    • Abnormal pleural fluid      Small right pleural fluid     • Nicotine dependence    • Impaired attitude towards nutritional status    • Pneumonia due to infectious organism    • HTN (hypertension)      Plan: COPD with hypoxia (CMS/HCC): Continue duo nebs Brovana and Pulmicort  On 3 L nasal oxygen saturation 97%  BiPAP discontinued     Mass of upper lobe of right lung: consider diagnostic procedure when stable                 Abnormal pleural fluid: small right pleural fluid will tap when symptomatic currently no active intervention at this time                 Nicotine dependence: Continue Habitrol 21 mg  patch daily                 Impaired attitude towards nutritional status: Encourage nutrition continue Marinol orally          Pneumonia due to infectious organism: Sputum for Gram stain cultures and sensitivity  Continue with IV Zosyn empirically          HTN (hypertension): Continue losartan       DVT prophylaxis:  Medical DVT prophylaxis orders are present.    CODE STATUS:   Level Of Support Discussed With: Patient  Code Status: CPR  Medical Interventions (Level of Support Prior to Arrest): Full    Disposition: Per Primary MD.    Electronically signed by Jonathan Oliveira MD, 06/11/21, 5:57 AM EDT.

## 2021-06-11 NOTE — SIGNIFICANT NOTE
06/11/21 1559   Plan   Plan Comments MAX discussed bed offer with pts [facility]. [facility] has accepted beds for pt and pts  at [facility]. SW notified [facility] to begin precerts.

## 2021-06-11 NOTE — THERAPY TREATMENT NOTE
Acute Care - Physical Therapy Treatment Note   Marissa     Patient Name: Elsa Terry  : 1935  MRN: [id number]  Today's Date: 2021           PT Assessment (last 12 hours)      PT Evaluation and Treatment     Row Name 21 1200          Physical Therapy Time and Intention    Subjective Information  weakness  -CAROLE     Document Type  therapy note (daily note)  -CAROLE     Mode of Treatment  individual therapy  -CAROLE     Patient Effort  good  -CAROLE     Row Name 21 1200          Motor Skills    Therapeutic Exercise  hip;ankle;knee  -CAROLE     Row Name 21 1200          Hip (Therapeutic Exercise)    Hip (Therapeutic Exercise)  AROM (active range of motion)  -CAROLE     Hip AROM (Therapeutic Exercise)  bilateral;flexion;aBduction;aDduction 20 reps  -CAROLE     Row Name 21 1200          Knee (Therapeutic Exercise)    Knee (Therapeutic Exercise)  AROM (active range of motion)  -CAROLE     Knee AROM (Therapeutic Exercise)  bilateral;flexion;extension;quadriceps stretch;SLR (straight leg raise);SAQ (short arc quad);heel slides;supine 20 reps  -CAROLE     Row Name 21 1200 21 0830       Ankle (Therapeutic Exercise)    Ankle (Therapeutic Exercise)  AROM (active range of motion)  -CAROLE  --    Ankle AROM (Therapeutic Exercise)  --  dorsiflexion;plantarflexion;supine 20 reps  -CAROLE    Row Name 21 1200          Progress Summary (PT)    Progress Toward Functional Goals (PT)  progress toward functional goals as expected  -CAROLE     Daily Progress Summary (PT)  Patient presents with limitations in bed mobility, performing transfers, and ambulating. She is recommended skilled PT to address functional impairments.  -CAROLE       User Key  (r) = Recorded By, (t) = Taken By, (c) = Cosigned By    Initials Name Provider Type    CAROLE Anish Mitchell, PT Physical Therapist        Physical Therapy Education                 Title: PT OT SLP Therapies (Done)     Topic: Physical Therapy (Done)     Point: Mobility training (Done)      Learning Progress Summary           Patient Acceptance, E,TB, VU by DP at 6/9/2021 1406                   Point: Home exercise program (Done)     Learning Progress Summary           Patient Acceptance, E,TB, VU by DP at 6/9/2021 1406                   Point: Body mechanics (Done)     Learning Progress Summary           Patient Acceptance, E,TB, VU by DP at 6/9/2021 1406                   Point: Precautions (Done)     Learning Progress Summary           Patient Acceptance, E,TB, VU by DP at 6/9/2021 1406                               User Key     Initials Effective Dates Name Provider Type Discipline    DP 06/03/21 -  Jeromy Rivera, PT Physical Therapist PT              PT Recommendation and Plan     Progress Summary (PT)  Progress Toward Functional Goals (PT): progress toward functional goals as expected  Daily Progress Summary (PT): Patient presents with limitations in bed mobility, performing transfers, and ambulating. She is recommended skilled PT to address functional impairments.  Outcome Measures     Row Name 06/09/21 1400             How much help from another person do you currently need...    Turning from your back to your side while in flat bed without using bedrails?  3  -DP      Moving from lying on back to sitting on the side of a flat bed without bedrails?  3  -DP      Moving to and from a bed to a chair (including a wheelchair)?  3  -DP      Standing up from a chair using your arms (e.g., wheelchair, bedside chair)?  3  -DP      Climbing 3-5 steps with a railing?  2  -DP      To walk in hospital room?  2  -DP      AM-PAC 6 Clicks Score (PT)  16  -DP         Functional Assessment    Outcome Measure Options  AM-PAC 6 Clicks Basic Mobility (PT)  -DP        User Key  (r) = Recorded By, (t) = Taken By, (c) = Cosigned By    Initials Name Provider Type    DP Jeromy Rivera, PT Physical Therapist           Time Calculation:   PT Charges     Row Name 06/11/21 1215             Time Calculation    PT  Received On  06/11/21  -CAROLE      PT Goal Re-Cert Due Date  06/18/21  -CAROLE         Timed Charges    29937 - PT Therapeutic Exercise Minutes  25  -CAROLE         Total Minutes    Timed Charges Total Minutes  25  -CAROLE       Total Minutes  25  -CAROLE        User Key  (r) = Recorded By, (t) = Taken By, (c) = Cosigned By    Initials Name Provider Type    Anish Colvin, PT Physical Therapist        Therapy Charges for Today     Code Description Service Date Service Provider Modifiers Qty    34464751973 HC PT THER PROC EA 15 MIN 6/11/2021 Anish Mitchell, PT GP 2          PT G-Codes  Outcome Measure Options: AM-PAC 6 Clicks Daily Activity (OT), Optimal Instrument  AM-PAC 6 Clicks Score (PT): 17  AM-PAC 6 Clicks Score (OT): 15    Anish Mitchell, PT  6/11/2021

## 2021-06-11 NOTE — PLAN OF CARE
Goal Outcome Evaluation:  Plan of Care Reviewed With: patient           Outcome Summary: Has difficulty understanding what a care plan is, and currently thinks she is at home.me

## 2021-06-12 LAB
ANION GAP SERPL CALCULATED.3IONS-SCNC: 9.7 MMOL/L (ref 5–15)
ARTERIAL PATENCY WRIST A: POSITIVE
BASE EXCESS BLDA CALC-SCNC: -2.4 MMOL/L (ref -2–2)
BASOPHILS # BLD AUTO: 0.02 10*3/MM3 (ref 0–0.2)
BASOPHILS NFR BLD AUTO: 0.1 % (ref 0–1.5)
BDY SITE: ABNORMAL
BUN SERPL-MCNC: 35 MG/DL (ref 8–23)
BUN/CREAT SERPL: 39.3 (ref 7–25)
CALCIUM SPEC-SCNC: 8.3 MG/DL (ref 8.6–10.5)
CHLORIDE SERPL-SCNC: 113 MMOL/L (ref 98–107)
CO2 SERPL-SCNC: 21.3 MMOL/L (ref 22–29)
COHGB MFR BLD: 0.3 % (ref 0–1.5)
CREAT SERPL-MCNC: 0.89 MG/DL (ref 0.57–1)
DEPRECATED RDW RBC AUTO: 48.3 FL (ref 37–54)
EOSINOPHIL # BLD AUTO: 0.01 10*3/MM3 (ref 0–0.4)
EOSINOPHIL NFR BLD AUTO: 0.1 % (ref 0.3–6.2)
ERYTHROCYTE [DISTWIDTH] IN BLOOD BY AUTOMATED COUNT: 14.7 % (ref 12.3–15.4)
FHHB: 4.4 % (ref 0–5)
GAS FLOW AIRWAY: 15 LPM
GFR SERPL CREATININE-BSD FRML MDRD: 60 ML/MIN/1.73
GLUCOSE SERPL-MCNC: 101 MG/DL (ref 65–99)
HCO3 BLDA-SCNC: 22 MMOL/L (ref 22–26)
HCT VFR BLD AUTO: 36.1 % (ref 34–46.6)
HGB BLD-MCNC: 10.9 G/DL (ref 12–15.9)
HGB BLDA-MCNC: 12 G/DL (ref 11.7–14.6)
IMM GRANULOCYTES # BLD AUTO: 0.06 10*3/MM3 (ref 0–0.05)
IMM GRANULOCYTES NFR BLD AUTO: 0.4 % (ref 0–0.5)
INHALED O2 CONCENTRATION: 100 %
LYMPHOCYTES # BLD AUTO: 2.49 10*3/MM3 (ref 0.7–3.1)
LYMPHOCYTES NFR BLD AUTO: 18.3 % (ref 19.6–45.3)
MCH RBC QN AUTO: 27.1 PG (ref 26.6–33)
MCHC RBC AUTO-ENTMCNC: 30.2 G/DL (ref 31.5–35.7)
MCV RBC AUTO: 89.8 FL (ref 79–97)
METHGB BLD QL: 0.1 % (ref 0–1.5)
MODALITY: ABNORMAL
MONOCYTES # BLD AUTO: 0.61 10*3/MM3 (ref 0.1–0.9)
MONOCYTES NFR BLD AUTO: 4.5 % (ref 5–12)
NEUTROPHILS NFR BLD AUTO: 10.43 10*3/MM3 (ref 1.7–7)
NEUTROPHILS NFR BLD AUTO: 76.6 % (ref 42.7–76)
NRBC BLD AUTO-RTO: 0 /100 WBC (ref 0–0.2)
OXYHGB MFR BLDV: 95.2 % (ref 94–99)
PCO2 BLDA: 37 MM HG (ref 35–45)
PH BLDA: 7.39 PH UNITS (ref 7.35–7.45)
PLATELET # BLD AUTO: 238 10*3/MM3 (ref 140–450)
PMV BLD AUTO: 10 FL (ref 6–12)
PO2 BLD: 86 MM[HG] (ref 0–500)
PO2 BLDA: 86.1 MM HG (ref 80–100)
POTASSIUM SERPL-SCNC: 3.6 MMOL/L (ref 3.5–5.2)
RBC # BLD AUTO: 4.02 10*6/MM3 (ref 3.77–5.28)
SAO2 % BLDCOA: 95.6 % (ref 95–99)
SODIUM SERPL-SCNC: 144 MMOL/L (ref 136–145)
WBC # BLD AUTO: 13.62 10*3/MM3 (ref 3.4–10.8)

## 2021-06-12 PROCEDURE — 63710000001 DRONABINOL PER 2.5 MG: Performed by: HOSPITALIST

## 2021-06-12 PROCEDURE — 85025 COMPLETE CBC W/AUTO DIFF WBC: CPT | Performed by: INTERNAL MEDICINE

## 2021-06-12 PROCEDURE — 25010000002 IRON SUCROSE PER 1 MG: Performed by: INTERNAL MEDICINE

## 2021-06-12 PROCEDURE — 94799 UNLISTED PULMONARY SVC/PX: CPT

## 2021-06-12 PROCEDURE — 25010000002 CEFTRIAXONE PER 250 MG: Performed by: INTERNAL MEDICINE

## 2021-06-12 PROCEDURE — 94668 MNPJ CHEST WALL SBSQ: CPT

## 2021-06-12 PROCEDURE — 80048 BASIC METABOLIC PNL TOTAL CA: CPT | Performed by: INTERNAL MEDICINE

## 2021-06-12 RX ORDER — ONDANSETRON 4 MG/1
8 TABLET, ORALLY DISINTEGRATING ORAL EVERY 6 HOURS PRN
Status: DISCONTINUED | OUTPATIENT
Start: 2021-06-12 | End: 2021-06-18 | Stop reason: HOSPADM

## 2021-06-12 RX ORDER — ONDANSETRON 4 MG/1
8 TABLET, ORALLY DISINTEGRATING ORAL EVERY 6 HOURS PRN
Status: DISCONTINUED | OUTPATIENT
Start: 2021-06-12 | End: 2021-06-12 | Stop reason: SDUPTHER

## 2021-06-12 RX ADMIN — GUAIFENESIN 600 MG: 600 TABLET, EXTENDED RELEASE ORAL at 21:48

## 2021-06-12 RX ADMIN — DOCUSATE SODIUM 50MG AND SENNOSIDES 8.6MG 2 TABLET: 8.6; 5 TABLET, FILM COATED ORAL at 21:45

## 2021-06-12 RX ADMIN — FAMOTIDINE 40 MG: 20 TABLET, FILM COATED ORAL at 09:41

## 2021-06-12 RX ADMIN — DRONABINOL 5 MG: 2.5 CAPSULE ORAL at 09:41

## 2021-06-12 RX ADMIN — CEFTRIAXONE 1 G: 10 INJECTION, POWDER, FOR SOLUTION INTRAVENOUS at 22:35

## 2021-06-12 RX ADMIN — ARFORMOTEROL TARTRATE 15 MCG: 15 SOLUTION RESPIRATORY (INHALATION) at 07:32

## 2021-06-12 RX ADMIN — DOCUSATE SODIUM 50MG AND SENNOSIDES 8.6MG 2 TABLET: 8.6; 5 TABLET, FILM COATED ORAL at 09:41

## 2021-06-12 RX ADMIN — GUAIFENESIN 600 MG: 600 TABLET, EXTENDED RELEASE ORAL at 09:41

## 2021-06-12 RX ADMIN — METOPROLOL SUCCINATE 25 MG: 25 TABLET, EXTENDED RELEASE ORAL at 09:41

## 2021-06-12 RX ADMIN — IPRATROPIUM BROMIDE AND ALBUTEROL SULFATE 3 ML: .5; 2.5 SOLUTION RESPIRATORY (INHALATION) at 13:50

## 2021-06-12 RX ADMIN — Medication 5 MG: at 21:50

## 2021-06-12 RX ADMIN — APIXABAN 2.5 MG: 2.5 TABLET, FILM COATED ORAL at 09:41

## 2021-06-12 RX ADMIN — SODIUM CHLORIDE, PRESERVATIVE FREE 10 ML: 5 INJECTION INTRAVENOUS at 22:35

## 2021-06-12 RX ADMIN — APIXABAN 2.5 MG: 2.5 TABLET, FILM COATED ORAL at 21:46

## 2021-06-12 RX ADMIN — DRONABINOL 5 MG: 2.5 CAPSULE ORAL at 21:52

## 2021-06-12 RX ADMIN — IPRATROPIUM BROMIDE AND ALBUTEROL SULFATE 3 ML: .5; 2.5 SOLUTION RESPIRATORY (INHALATION) at 07:32

## 2021-06-12 RX ADMIN — SODIUM CHLORIDE, PRESERVATIVE FREE 10 ML: 5 INJECTION INTRAVENOUS at 09:43

## 2021-06-12 RX ADMIN — BUDESONIDE 0.5 MG: 0.5 INHALANT ORAL at 07:32

## 2021-06-12 RX ADMIN — LOSARTAN POTASSIUM 100 MG: 50 TABLET, FILM COATED ORAL at 09:43

## 2021-06-12 RX ADMIN — IRON SUCROSE 400 MG: 20 INJECTION, SOLUTION INTRAVENOUS at 14:24

## 2021-06-12 NOTE — PROGRESS NOTES
[facility]     Progress Note    Patient Name: Elas Terry  : 1935  MRN: [id number]  Primary Care Physician:  Joaquin Oliveira MD  Date of admission: 2021    Subjective   Subjective     Chief Complaint: On room air saturation 95%   Possible placement in [facility] and rehab [facility]   Undergoing physical therapy   Not completely oriented at times   States she had diarrhea vomiting and also complains of right-sided chest pain   able to speak full sentences  No temperature spike  Cough nonproductive  Not in apparent respiratory distress    HPI:  Patient Reports she has loose bowel movements  Complains of vomiting however nursing does not agree with this  Her chest hurts at times  No acute respiratory complaints at this time  Appetite fair with poor nutritional status    Review of Systems Denies any fever feels fatigued weight loss which she cannot quantitate  HEENT: Bilateral lens implants no nasal congestion no epistaxis  Respiratory system: Scoliotic spine with good air entry bilaterally with decreased sounds in the bases wheezing on and off  Cardiovascular system: Regular rhythm no chest pain or palpitations noted  Gastrointestinal system: No abdominal pain nausea vomiting or constipation at this time  Genitourinary system: Denies dysuria hesitancy  Musculoskeletal system: Generalized weakness bilaterally  Endocrine: Easily fatigues  Hematology: No bleeding bruising or swollen glands  Psychiatric: History of anxiety  Neurologic: Easily arousable and can communicate with full sentences generalized weakness bilaterally  Skin: No edema or rash noted       Objective   Objective     Vitals:   Temp:  [97.5 °F (36.4 °C)-98.06 °F (36.7 °C)] 97.8 °F (36.6 °C)  Heart Rate:  [73-89] 84  Resp:  [16-20] 18  BP: (114-147)/(45-62) 120/62  Flow (L/min):  [3] 3  Physical Exam:   Constitutional: Awake, alert   Eyes: PERRLA, sclerae anicteric, no conjunctival injection   HENT: NCAT, mucous membranes  moist   Neck: Supple, no thyromegaly, no lymphadenopathy, trachea midline   Respiratory: Clear to auscultation bilaterally, nonlabored respirations    Cardiovascular: RRR, no murmurs, rubs, or gallops, palpable pedal pulses bilaterally   Gastrointestinal: Positive bowel sounds, soft, nontender, nondistended   Musculoskeletal: No bilateral ankle edema, no clubbing or cyanosis to extremities   Psychiatric: Appropriate affect, cooperative   Neurologic: Oriented x 3, strength symmetric in all extremities, Cranial Nerves grossly intact to confrontation, speech clear   Skin: No rashes    Result Review    Result Review:  I have personally reviewed the results from the time of this admission to 6/12/2021 05:59 EDT and agree with these findings:  [x]  Laboratory  []  Microbiology  []  Radiology  []  EKG/Telemetry   []  Cardiology/Vascular   []  Pathology  []  Old records  []  Other:  Most notable findings include: BUN and creatinine 33/1.04 GFR 50 L  Hemoglobin and hematocrit 8/27    Assessment/Plan   Assessment / Plan     Brief Patient Summary:  Elsa Terry is a 85 y.o. female who hemoglobin hematocrit: 8/27 received 1 dose of Venofer yesterday  BUN/creatinine: 33/1.04 and GFR 50 L  On room air saturation maintained at 95%   Being precertified for placement into [facility] and rehab     Active Hospital Problems:  Active Hospital Problems    Diagnosis    • **COPD with hypoxia (CMS/HCC)    • Iron deficiency anemia, unspecified    • Severe malnutrition (CMS/HCC)    • Mass of upper lobe of right lung    • Abnormal pleural fluid      Small right pleural fluid     • Nicotine dependence    • Impaired attitude towards nutritional status    • Pneumonia due to infectious organism    • HTN (hypertension)      Plan: COPD with hypoxia (CMS/HCC): Continue duo nebs Brovana and Pulmicort  On room air oxygen saturation 95%    Mass of upper lobe of right lung: consider diagnostic procedure when stable                 Abnormal  pleural fluid: small right pleural fluid will tap when symptomatic currently no active intervention at this time                 Nicotine dependence: Continue Habitrol 21 mg patch daily                 Impaired attitude towards nutritional status: Encourage nutrition continue Marinol orally          Pneumonia due to infectious organism: Sputum for Gram stain cultures and sensitivity  Continue with IV Zosyn empirically          HTN (hypertension): Continue losartan    Loose bowel movement: Consider to hold Isabell-Colace MiraLAX or Dulcolax at this time       DVT prophylaxis:  Medical DVT prophylaxis orders are present.    CODE STATUS:   Level Of Support Discussed With: Patient  Code Status: CPR  Medical Interventions (Level of Support Prior to Arrest): Full    Disposition: Per Primary MD.    Electronically signed by Jonathan Oliveira MD, 06/12/21, 5:50 AM EDT.

## 2021-06-12 NOTE — PLAN OF CARE
Goal Outcome Evaluation:  Plan of Care Reviewed With: patient           Outcome Summary: Has difficulty understanding what a care plan is, and currently thinks she is at home. me

## 2021-06-12 NOTE — PROGRESS NOTES
[facility]     Progress Note    Patient Name: Elsa Terry  : 1935  MRN: [id number]  Primary Care Physician:  Joaquin Oliveira MD  Date of admission: 2021    Subjective   Subjective     Chief Complaint: Dyspnea and weakness    HPI:  Patient Reports she feels weak today    Review of Systems   Review of Systems   Constitutional: Activity change: weakness.   Respiratory: Positive for shortness of breath.    Cardiovascular: Negative for chest pain and leg swelling.   Gastrointestinal: Positive for diarrhea and nausea.   Musculoskeletal: Positive for arthralgias.   Neurological: Positive for weakness.   Psychiatric/Behavioral: Positive for confusion.       Objective   Objective     Vitals:   Temp:  [97.5 °F (36.4 °C)-98.06 °F (36.7 °C)] 97.9 °F (36.6 °C)  Heart Rate:  [73-89] 82  Resp:  [16-22] 20  BP: (114-163)/(45-73) 145/73  Flow (L/min):  [3] 3  Physical Exam    Constitutional: Awake, alert,talking   Eyes: PERRLA, sclerae anicteric, no conjunctival injection   HENT: NCAT, mucous membranes moist   Neck: Supple, no thyromegaly, no lymphadenopathy, trachea midline   Respiratory: Clear to auscultation bilaterally, nonlabored respirations    Cardiovascular: RRR, no murmurs, rubs, or gallops, palpable pedal pulses bilaterally   Gastrointestinal: Positive bowel sounds, soft, nontender, nondistended   Musculoskeletal: No bilateral ankle edema, no clubbing or cyanosis to extremities   Psychiatric: Appropriate affect, cooperative,confused at times   Neurologic: Oriented x 3, strength symmetric in all extremities, Cranial Nerves grossly intact to confrontation, speech clear   Skin: No rashes     Result Review    Result Review:  I have personally reviewed the results from the time of this admission to 2021 11:06 EDT and agree with these findings:  [x]  Laboratory  []  Microbiology  []  Radiology  []  EKG/Telemetry   []  Cardiology/Vascular   []  Pathology  []  Old records  []  Other:  Most notable  findings include: Hemoglobin is 10.9 today was 8.2 yesterday.  Tolerated one dose of IV Venofer    Assessment/Plan   Assessment / Plan     Brief Patient Summary:  Elsa Terry is a 85 y.o. female who was admitted with confusion hypoxia dyspnea    Active Hospital Problems:  Active Hospital Problems    Diagnosis    • **COPD with hypoxia (CMS/HCC)    • Iron deficiency anemia, unspecified    • Severe malnutrition (CMS/HCC)    • Mass of upper lobe of right lung    • Abnormal pleural fluid      Small right pleural fluid     • Nicotine dependence    • Impaired attitude towards nutritional status    • Pneumonia due to infectious organism    • HTN (hypertension)      Plan:   Her chest x-ray shows persistent right upper lobe mass and some pneumonia in the bases.  She also has iron deficiency.  Because of her advanced age we will monitor the lung mass.  She is high risk for biopsy because of her severe kyphosis, hypoxia COPD, location of the mass and advanced age.  She is severely underweight with malnourishment. Discussed with family we will continue to monitor.  She will be going to a rehab bed when available.  Continue antibiotics.    DVT prophylaxis:  Medical DVT prophylaxis orders are present.    CODE STATUS:   Level Of Support Discussed With: Patient  Code Status: CPR  Medical Interventions (Level of Support Prior to Arrest): Full      Electronically signed by Joaquin Oliveira MD, 06/12/21, 11:06 AM EDT.

## 2021-06-13 PROCEDURE — 94799 UNLISTED PULMONARY SVC/PX: CPT

## 2021-06-13 PROCEDURE — 63710000001 DRONABINOL PER 2.5 MG: Performed by: HOSPITALIST

## 2021-06-13 PROCEDURE — 25010000002 CEFTRIAXONE PER 250 MG: Performed by: INTERNAL MEDICINE

## 2021-06-13 PROCEDURE — 25010000002 CEFTRIAXONE: Performed by: INTERNAL MEDICINE

## 2021-06-13 RX ADMIN — GUAIFENESIN 600 MG: 600 TABLET, EXTENDED RELEASE ORAL at 21:01

## 2021-06-13 RX ADMIN — DRONABINOL 5 MG: 2.5 CAPSULE ORAL at 08:49

## 2021-06-13 RX ADMIN — CEFTRIAXONE 1 G: 10 INJECTION, POWDER, FOR SOLUTION INTRAVENOUS at 21:03

## 2021-06-13 RX ADMIN — SODIUM CHLORIDE, PRESERVATIVE FREE 10 ML: 5 INJECTION INTRAVENOUS at 21:02

## 2021-06-13 RX ADMIN — IPRATROPIUM BROMIDE AND ALBUTEROL SULFATE 3 ML: .5; 2.5 SOLUTION RESPIRATORY (INHALATION) at 12:12

## 2021-06-13 RX ADMIN — APIXABAN 2.5 MG: 2.5 TABLET, FILM COATED ORAL at 08:49

## 2021-06-13 RX ADMIN — ARFORMOTEROL TARTRATE 15 MCG: 15 SOLUTION RESPIRATORY (INHALATION) at 19:08

## 2021-06-13 RX ADMIN — DRONABINOL 5 MG: 2.5 CAPSULE ORAL at 21:01

## 2021-06-13 RX ADMIN — GUAIFENESIN 600 MG: 600 TABLET, EXTENDED RELEASE ORAL at 08:50

## 2021-06-13 RX ADMIN — ARFORMOTEROL TARTRATE 15 MCG: 15 SOLUTION RESPIRATORY (INHALATION) at 06:44

## 2021-06-13 RX ADMIN — BUDESONIDE 0.5 MG: 0.5 INHALANT ORAL at 19:08

## 2021-06-13 RX ADMIN — IPRATROPIUM BROMIDE AND ALBUTEROL SULFATE 3 ML: .5; 2.5 SOLUTION RESPIRATORY (INHALATION) at 19:08

## 2021-06-13 RX ADMIN — APIXABAN 2.5 MG: 2.5 TABLET, FILM COATED ORAL at 21:01

## 2021-06-13 RX ADMIN — BUDESONIDE 0.5 MG: 0.5 INHALANT ORAL at 06:44

## 2021-06-13 RX ADMIN — DOCUSATE SODIUM 50MG AND SENNOSIDES 8.6MG 2 TABLET: 8.6; 5 TABLET, FILM COATED ORAL at 08:50

## 2021-06-13 RX ADMIN — METOPROLOL SUCCINATE 25 MG: 25 TABLET, EXTENDED RELEASE ORAL at 08:50

## 2021-06-13 RX ADMIN — LOSARTAN POTASSIUM 100 MG: 50 TABLET, FILM COATED ORAL at 08:49

## 2021-06-13 RX ADMIN — SODIUM CHLORIDE, PRESERVATIVE FREE 10 ML: 5 INJECTION INTRAVENOUS at 08:51

## 2021-06-13 RX ADMIN — IPRATROPIUM BROMIDE AND ALBUTEROL SULFATE 3 ML: .5; 2.5 SOLUTION RESPIRATORY (INHALATION) at 06:44

## 2021-06-13 RX ADMIN — FAMOTIDINE 40 MG: 20 TABLET, FILM COATED ORAL at 08:50

## 2021-06-13 RX ADMIN — DOCUSATE SODIUM 50MG AND SENNOSIDES 8.6MG 2 TABLET: 8.6; 5 TABLET, FILM COATED ORAL at 21:01

## 2021-06-13 NOTE — PROGRESS NOTES
[facility]     Progress Note    Patient Name: Elsa Terry  : 1935  MRN: [id number]  Primary Care Physician:  Joaquin Oliveira MD  Date of admission: 2021    Subjective   Subjective     Chief Complaint: On room air saturation 95%   Not completely oriented at times   States she had vomiting and also complains of right-sided chest pain   able to speak full sentences  No temperature spike  Cough nonproductive  Not in apparent respiratory distress    HPI:  Patient Reports   Complains of vomiting however nursing does not agree with this  Her chest hurts at times  No acute respiratory complaints at this time  Appetite fair with poor nutritional status    Review of Systems  Denies any fever feels fatigued weight loss which she cannot quantitate  HEENT: Bilateral lens implants no nasal congestion no epistaxis  Respiratory system: Scoliotic spine with good air entry bilaterally with decreased sounds in the bases wheezing on and off  Cardiovascular system: Regular rhythm no chest pain or palpitations noted  Gastrointestinal system: No abdominal pain nausea vomiting or constipation at this time  Genitourinary system: Denies dysuria hesitancy  Musculoskeletal system: Generalized weakness bilaterally  Endocrine: Easily fatigues  Hematology: No bleeding bruising or swollen glands  Psychiatric: History of anxiety  Neurologic: Easily arousable and can communicate with full sentences generalized weakness bilaterally  Skin: No edema or rash noted       Objective   Objective     Vitals:   Temp:  [97.7 °F (36.5 °C)-98.2 °F (36.8 °C)] 97.9 °F (36.6 °C)  Heart Rate:  [74-88] 86  Resp:  [16-22] 18  BP: (117-163)/(54-78) 136/78  Flow (L/min):  [3] 3  Physical Exam:   Constitutional: Awake, alert   Eyes: PERRLA, sclerae anicteric, no conjunctival injection   HENT: NCAT, mucous membranes moist   Neck: Supple, no thyromegaly, no lymphadenopathy, trachea midline   Respiratory: Clear to auscultation bilaterally, nonlabored  respirations    Cardiovascular: RRR, no murmurs, rubs, or gallops, palpable pedal pulses bilaterally   Gastrointestinal: Positive bowel sounds, soft, nontender, nondistended   Musculoskeletal: No bilateral ankle edema, no clubbing or cyanosis to extremities   Psychiatric: Appropriate affect, cooperative   Neurologic: Oriented x 3, strength symmetric in all extremities, Cranial Nerves grossly intact to confrontation, speech clear   Skin: No rashes    Result Review    Result Review:  I have personally reviewed the results from the time of this admission to 6/13/2021 05:36 EDT and agree with these findings:  [x]  Laboratory  []  Microbiology  []  Radiology  []  EKG/Telemetry   []  Cardiology/Vascular   []  Pathology  []  Old records  []  Other:  Most notable findings include: BUN 35  Hemoglobin hematocrit 10/36  Possible rehab placement   Assessment/Plan   Assessment / Plan     Brief Patient Summary:  Elsa Terry is a 85 y.o. female who hemoglobin hematocrit: 10/36 received 1 dose of Venofer yesterday  BUN/creatinine: 35  On room air saturation maintained at 95%   Being precertified for placement into [facility] and rehab     Active Hospital Problems:  Active Hospital Problems    Diagnosis    • **COPD with hypoxia (CMS/HCC)    • Iron deficiency anemia, unspecified    • Severe malnutrition (CMS/HCC)    • Mass of upper lobe of right lung    • Abnormal pleural fluid      Small right pleural fluid     • Nicotine dependence    • Impaired attitude towards nutritional status    • Pneumonia due to infectious organism    • HTN (hypertension)      Plan: COPD with hypoxia (CMS/HCC): Continue duo nebs Brovana and Pulmicort  On room air oxygen saturation 95%     Mass of upper lobe of right lung: consider diagnostic procedure when stable                 Abnormal pleural fluid: small right pleural fluid will tap when symptomatic currently no active intervention at this time                 Nicotine  dependence: Continue Habitrol 21 mg patch daily                 Impaired attitude towards nutritional status: Encourage nutrition continue Marinol orally          Pneumonia due to infectious organism: Sputum for Gram stain cultures and sensitivity  Continue with IV Zosyn empirically          HTN (hypertension): Continue losartan       DVT prophylaxis:  Medical DVT prophylaxis orders are present.    CODE STATUS:   Level Of Support Discussed With: Patient  Code Status: CPR  Medical Interventions (Level of Support Prior to Arrest): Full    Disposition: Per Primary MD.    Electronically signed by Jonathan Oliveira MD, 06/13/21, 5:36 AM EDT.

## 2021-06-13 NOTE — PLAN OF CARE
Goal Outcome Evaluation:  Plan of Care Reviewed With: patient        Progress: improving  Outcome Summary: Has difficulty understanding what a care plan is, and currently thinks she is at home

## 2021-06-13 NOTE — PROGRESS NOTES
[facility]     Progress Note    Patient Name: Elsa Terry  : 1935  MRN: [id number]  Primary Care Physician:  Joaquin Oliveira MD  Date of admission: 2021    Subjective   Subjective     Chief Complaint: Weak and bedridden.  Breathing is much better    HPI:  Patient Reports breathing is better    Review of Systems   Review of Systems   Constitutional: Positive for activity change.   Respiratory: Negative for cough. Shortness of breath: mild.    Cardiovascular: Negative for chest pain and leg swelling.   Musculoskeletal: Positive for arthralgias and back pain.   Neurological: Positive for weakness.   All other systems reviewed and are negative.      Objective   Objective     Vitals:   Temp:  [97.7 °F (36.5 °C)-98.6 °F (37 °C)] 97.7 °F (36.5 °C)  Heart Rate:  [] 82  Resp:  [16-20] 16  BP: (111-156)/(54-78) 111/58  Physical Exam    Constitutional: Awake, alert, oriented cooperative pleasant female.  Extremely thin   Eyes: PERRLA, sclerae anicteric, no conjunctival injection   HENT: NCAT, mucous membranes moist   Neck: Supple, no thyromegaly, no lymphadenopathy, trachea midline   Respiratory: Clear to auscultation bilaterally, nonlabored respirations .  Severe kyphosis.  Scattered wheezes   Cardiovascular: RRR, no murmurs, rubs, or gallops, palpable pedal pulses bilaterally   Gastrointestinal: Positive bowel sounds, soft, nontender, nondistended   Musculoskeletal: No bilateral ankle edema, no clubbing or cyanosis to extremities   Psychiatric: Appropriate affect, cooperative   Neurologic: Oriented x 3, strength symmetric in all extremities, Cranial Nerves grossly intact to confrontation, speech clear.  Generalized weakness   Skin: No rashes     Result Review    Result Review:  I have personally reviewed the results from the time of this admission to 2021 12:28 EDT and agree with these findings:  [x]  Laboratory  []  Microbiology  []  Radiology  []  EKG/Telemetry   []  Cardiology/Vascular    []  Pathology  []  Old records  []  Other:  Most notable findings include: Right upper lobe lung mass and pneumonia    Assessment/Plan   Assessment / Plan     Brief Patient Summary:  Elsa Terry is a 85 y.o. female who was admitted with COPD exacerbation pneumonia and severe hypoxia.  Has persistent lung mass    Active Hospital Problems:  Active Hospital Problems    Diagnosis    • **COPD with hypoxia (CMS/HCC)    • Iron deficiency anemia, unspecified    • Severe malnutrition (CMS/HCC)    • Mass of upper lobe of right lung    • Abnormal pleural fluid      Small right pleural fluid     • Nicotine dependence    • Impaired attitude towards nutritional status    • Pneumonia due to infectious organism    • HTN (hypertension)      Plan:   Continue treating her pneumonia.  Hypoxia has improved.  Given IV iron for iron deficiency anemia.  Followed by dietitian for malnutrition.  Has persistent right upper lobe mass which will be monitored as outpatient.  Awaiting bed for rehab.  Discussed care with son and daughter-in-law on Friday.    DVT prophylaxis:  Medical DVT prophylaxis orders are present.    CODE STATUS:   Level Of Support Discussed With: Patient  Code Status: CPR  Medical Interventions (Level of Support Prior to Arrest): Full      Electronically signed by Joaquin Oliveira MD, 06/13/21, 12:28 PM EDT.

## 2021-06-14 LAB — HEMOCCULT STL QL: POSITIVE

## 2021-06-14 PROCEDURE — 94799 UNLISTED PULMONARY SVC/PX: CPT

## 2021-06-14 PROCEDURE — 63710000001 DRONABINOL PER 2.5 MG: Performed by: INTERNAL MEDICINE

## 2021-06-14 PROCEDURE — 25010000002 CEFTRIAXONE PER 250 MG: Performed by: INTERNAL MEDICINE

## 2021-06-14 PROCEDURE — 82272 OCCULT BLD FECES 1-3 TESTS: CPT | Performed by: INTERNAL MEDICINE

## 2021-06-14 RX ORDER — DRONABINOL 2.5 MG/1
5 CAPSULE ORAL 2 TIMES DAILY
Status: DISCONTINUED | OUTPATIENT
Start: 2021-06-14 | End: 2021-06-18 | Stop reason: HOSPADM

## 2021-06-14 RX ADMIN — BUDESONIDE 0.5 MG: 0.5 INHALANT ORAL at 07:24

## 2021-06-14 RX ADMIN — DOCUSATE SODIUM 50MG AND SENNOSIDES 8.6MG 2 TABLET: 8.6; 5 TABLET, FILM COATED ORAL at 09:06

## 2021-06-14 RX ADMIN — IPRATROPIUM BROMIDE AND ALBUTEROL SULFATE 3 ML: .5; 2.5 SOLUTION RESPIRATORY (INHALATION) at 20:09

## 2021-06-14 RX ADMIN — SODIUM CHLORIDE, PRESERVATIVE FREE 10 ML: 5 INJECTION INTRAVENOUS at 09:06

## 2021-06-14 RX ADMIN — BUDESONIDE 0.5 MG: 0.5 INHALANT ORAL at 20:09

## 2021-06-14 RX ADMIN — IPRATROPIUM BROMIDE AND ALBUTEROL SULFATE 3 ML: .5; 2.5 SOLUTION RESPIRATORY (INHALATION) at 14:27

## 2021-06-14 RX ADMIN — ARFORMOTEROL TARTRATE 15 MCG: 15 SOLUTION RESPIRATORY (INHALATION) at 07:24

## 2021-06-14 RX ADMIN — APIXABAN 2.5 MG: 2.5 TABLET, FILM COATED ORAL at 09:06

## 2021-06-14 RX ADMIN — SODIUM CHLORIDE, PRESERVATIVE FREE 10 ML: 5 INJECTION INTRAVENOUS at 21:11

## 2021-06-14 RX ADMIN — GUAIFENESIN 600 MG: 600 TABLET, EXTENDED RELEASE ORAL at 09:05

## 2021-06-14 RX ADMIN — ARFORMOTEROL TARTRATE 15 MCG: 15 SOLUTION RESPIRATORY (INHALATION) at 20:09

## 2021-06-14 RX ADMIN — CEFTRIAXONE 1 G: 10 INJECTION, POWDER, FOR SOLUTION INTRAVENOUS at 21:10

## 2021-06-14 RX ADMIN — METOPROLOL SUCCINATE 25 MG: 25 TABLET, EXTENDED RELEASE ORAL at 09:06

## 2021-06-14 RX ADMIN — DRONABINOL 5 MG: 2.5 CAPSULE ORAL at 21:11

## 2021-06-14 RX ADMIN — IPRATROPIUM BROMIDE AND ALBUTEROL SULFATE 3 ML: .5; 2.5 SOLUTION RESPIRATORY (INHALATION) at 07:24

## 2021-06-14 RX ADMIN — DOCUSATE SODIUM 50MG AND SENNOSIDES 8.6MG 2 TABLET: 8.6; 5 TABLET, FILM COATED ORAL at 21:11

## 2021-06-14 RX ADMIN — APIXABAN 2.5 MG: 2.5 TABLET, FILM COATED ORAL at 21:11

## 2021-06-14 RX ADMIN — GUAIFENESIN 600 MG: 600 TABLET, EXTENDED RELEASE ORAL at 21:11

## 2021-06-14 RX ADMIN — FAMOTIDINE 40 MG: 20 TABLET, FILM COATED ORAL at 09:06

## 2021-06-14 RX ADMIN — LOSARTAN POTASSIUM 100 MG: 50 TABLET, FILM COATED ORAL at 09:06

## 2021-06-14 NOTE — PROGRESS NOTES
[facility]     Progress Note    Patient Name: Elsa Terry  : 1935  MRN: [id number]  Primary Care Physician:  Joaquin Oliveira MD  Date of admission: 2021    Subjective   Subjective     Chief Complaint: hypoxia    HPI:  Patient Reports she is breathing better today,not using her oxygen.    Review of Systems   Review of Systems   Constitutional: Positive for fatigue. Activity change: walking with PT.   Respiratory: Positive for shortness of breath.    Cardiovascular: Negative for leg swelling.   Neurological: Positive for weakness.   Psychiatric/Behavioral: Positive for confusion.       Objective   Objective     Vitals:   Temp:  [97.5 °F (36.4 °C)-98.2 °F (36.8 °C)] 98.1 °F (36.7 °C)  Heart Rate:  [67-89] 74  Resp:  [16-20] 18  BP: (106-137)/(51-58) 125/56  Flow (L/min):  [0] 0  Physical Exam    Constitutional: Awake, alert,talking,not on NC   Eyes: PERRLA, sclerae anicteric, no conjunctival injection   HENT: NCAT, mucous membranes moist   Neck: Supple, no thyromegaly, no lymphadenopathy, trachea midline   Respiratory: Clear to auscultation bilaterally, nonlabored respirations ,decreased BS bilaterally. Kyphosis   Cardiovascular: RRR, no murmurs, rubs, or gallops, palpable pedal pulses bilaterally   Gastrointestinal: Positive bowel sounds, soft, nontender, nondistended   Musculoskeletal: No bilateral ankle edema, no clubbing or cyanosis to extremities   Psychiatric: Appropriate affect, cooperative,confused occasionally   Neurologic: Oriented x 3, strength symmetric in all extremities, Cranial Nerves grossly intact to confrontation, speech clear   Skin: No rashes     Result Review    Result Review:  I have personally reviewed the results from the time of this admission to 2021 18:40 EDT and agree with these findings:  [x]  Laboratory  []  Microbiology  []  Radiology  []  EKG/Telemetry   []  Cardiology/Vascular   []  Pathology  []  Old records  []  Other:  Most notable findings include:  hypoxia has improved    Assessment/Plan   Assessment / Plan     Brief Patient Summary:  Elsa Terry is a 85 y.o. female who was admitted with dyspnea and hypoxia    Active Hospital Problems:  Active Hospital Problems    Diagnosis    • **COPD with hypoxia (CMS/HCC)    • Iron deficiency anemia, unspecified    • Severe malnutrition (CMS/HCC)    • Mass of upper lobe of right lung    • Abnormal pleural fluid      Small right pleural fluid     • Nicotine dependence    • Impaired attitude towards nutritional status    • Pneumonia due to infectious organism    • HTN (hypertension)      Plan: She is doing well with Antibiotics.Walking with PT. Rehab bed when available.       DVT prophylaxis:  Medical DVT prophylaxis orders are present.    CODE STATUS:   Level Of Support Discussed With: Patient  Code Status: CPR  Medical Interventions (Level of Support Prior to Arrest): Full      Electronically signed by Joaquin Oliveira MD, 06/14/21, 6:40 PM EDT.

## 2021-06-14 NOTE — PROGRESS NOTES
[facility]     Progress Note    Patient Name: Elsa Terry  : 1935  MRN: [id number]  Primary Care Physician:  Joaquin Oliveira MD  Date of admission: 2021    Subjective   Subjective     Chief Complaint: Less confused today  Maintain saturation 98% on room air  No vomiting  Chest pain improving  Pending placement into rehab    HPI:  Patient Reports mental status improving  No acute respiratory complaints  Nutrition evaluated  Pending placement in nursing and rehab facility    Review of Systems Denies any fever feels fatigued weight loss which she cannot quantitate  HEENT: Bilateral lens implants no nasal congestion no epistaxis  Respiratory system: Scoliotic spine with good air entry bilaterally with decreased sounds in the bases wheezing on and off  Cardiovascular system: Regular rhythm no chest pain or palpitations noted  Gastrointestinal system: No abdominal pain nausea vomiting or constipation at this time  Genitourinary system: Denies dysuria hesitancy  Musculoskeletal system: Generalized weakness bilaterally  Endocrine: Easily fatigues  Hematology: No bleeding bruising or swollen glands  Psychiatric: History of anxiety  Neurologic: Easily arousable and can communicate with full sentences generalized weakness bilaterally  Skin: No edema or rash noted       Objective   Objective     Vitals:   Temp:  [97.5 °F (36.4 °C)-98.6 °F (37 °C)] 97.5 °F (36.4 °C)  Heart Rate:  [] 89  Resp:  [16-18] 16  BP: (106-130)/(51-68) 130/51  Flow (L/min):  [0] 0  Physical Exam:   Constitutional: Awake, alert   Eyes: PERRLA, sclerae anicteric, no conjunctival injection   HENT: NCAT, mucous membranes moist   Neck: Supple, no thyromegaly, no lymphadenopathy, trachea midline   Respiratory: Clear to auscultation bilaterally, nonlabored respirations    Cardiovascular: RRR, no murmurs, rubs, or gallops, palpable pedal pulses bilaterally   Gastrointestinal: Positive bowel sounds, soft, nontender,  nondistended   Musculoskeletal: No bilateral ankle edema, no clubbing or cyanosis to extremities   Psychiatric: Appropriate affect, cooperative   Neurologic: Oriented x 3, strength symmetric in all extremities, Cranial Nerves grossly intact to confrontation, speech clear   Skin: No rashes    Result Review    Result Review:  I have personally reviewed the results from the time of this admission to 6/14/2021 05:54 EDT and agree with these findings:  []  Laboratory  []  Microbiology  []  Radiology  []  EKG/Telemetry   []  Cardiology/Vascular   []  Pathology  []  Old records  []  Other:  Most notable findings include: Mental status improving   Maintains room air saturation greater than 90%    Assessment/Plan   Assessment / Plan     Brief Patient Summary:  Elsa Terry is a 85 y.o. female who awaiting placement into nursing home and rehab facility  Currently improving  Ental status is also better    Active Hospital Problems:  Active Hospital Problems    Diagnosis    • **COPD with hypoxia (CMS/HCC)    • Iron deficiency anemia, unspecified    • Severe malnutrition (CMS/HCC)    • Mass of upper lobe of right lung    • Abnormal pleural fluid      Small right pleural fluid     • Nicotine dependence    • Impaired attitude towards nutritional status    • Pneumonia due to infectious organism    • HTN (hypertension)      Plan: COPD with hypoxia (CMS/HCC): Continue duo nebs Brovana and Pulmicort  On room air oxygen saturation 95%     Mass of upper lobe of right lung: consider diagnostic procedure when stable                 Abnormal pleural fluid: small right pleural fluid will tap when symptomatic currently no active intervention at this time                 Nicotine dependence: Continue Habitrol 21 mg patch daily                 Impaired attitude towards nutritional status: Encourage nutrition continue Marinol orally          Pneumonia due to infectious organism: Sputum for Gram stain cultures and sensitivity  Continue with  IV Zosyn empirically          HTN (hypertension): Continue losartan       DVT prophylaxis:  Medical DVT prophylaxis orders are present.    CODE STATUS:   Level Of Support Discussed With: Patient  Code Status: CPR  Medical Interventions (Level of Support Prior to Arrest): Full    Disposition: Per Primary MD.    Electronically signed by Jonathan Oliveira MD, 06/14/21, 5:54 AM EDT.

## 2021-06-14 NOTE — PLAN OF CARE
Goal Outcome Evaluation:           Progress: improving   Patient had some intermittent confusion this shift but has improved, still refusing some some turns Umu Lilly RN

## 2021-06-14 NOTE — CONSULTS
"Nutrition Services    Patient Name: Elsa Terry  YOB: 1935  MRN: [id number]  Admission date: 6/6/2021    Meets criteria for severe malnutrition.  Continue thrive BID.    CLINICAL NUTRITION ASSESSMENT      Reason for Assessment  MST score 2+, BMI     H&P:    Past Medical History:   Diagnosis Date   • Abnormal pleural fluid    • Colon cancer (CMS/HCC)    • COPD (chronic obstructive pulmonary disease) (CMS/HCC)    • DVT (deep venous thrombosis) (CMS/HCC)    • Elevated cholesterol    • Hypertension    • Mass of upper lobe of right lung    • Osteoporosis    • Pneumonia           Current Problems:   Active Hospital Problems    Diagnosis    • **COPD with hypoxia (CMS/HCC)    • Iron deficiency anemia, unspecified    • Severe malnutrition (CMS/HCC)    • Mass of upper lobe of right lung    • Abnormal pleural fluid      Small right pleural fluid     • Nicotine dependence    • Impaired attitude towards nutritional status    • Pneumonia due to infectious organism    • HTN (hypertension)           Nutrition/Diet History         Narrative     Pt reported improvement of intake. Stated she enjoys thrive and has been consuming all of it.  Previously this admission: Pt reported decreased haresh x 1 year. In that same time pt has lost 50-60lbs. Daughterkevin reported her wt was 77lbs 1 month ago at an office visit (10% wt loss in 1 month).  She has had quick satiation of meals and thus hasn't been eating a normal amount.  Uses dentures at mealtimes, however they are loose fitting. Per family, intake is improving w/ addition of medication. Pt requested diet education on protein rich foods.   Pt is receptive to thrive BID (540kcal, 18gPro)   Factors Affecting   Nutritional Intake Decreased appetite, dentures.      Estimated/Assessed Needs       Energy Requirements    Height for Calculation  Height: 142.2 cm (55.98\")   Weight for Calculation 33kg   Method for Estimation  25-30kcal/kg   EST Needs (kcal/day) 825-1155     " "  Protein Requirements    Weight for Calculation 33kg   EST Protein Needs (g/kg) 1.0-1.2g/kg   EST Daily Needs (g/day) 33-40       Fluid Requirements     Estimated Needs (mL/day) 825       Anthropometrics        Current Height, Weight Height: 142.2 cm (55.98\")  Weight: 40.1 kg (88 lb 6.5 oz) (06/14/21 0600)        Flowsheet Rows      First Filed Value   Admission Height  142.2 cm (56\") Documented at 06/06/2021 1430   Admission Weight  34 kg (74 lb 15.3 oz) Documented at 06/06/2021 1430             Ideal Body Weight (IBW) 56.8kg   % Ideal Body Weight 58%       Usual Body Weight 54.5kg   % Usual Body Weight 58%   Wt Change Observation No recent wt change   Weight Hx    Wt Readings from Last 30 Encounters:   06/14/21 0600 40.1 kg (88 lb 6.5 oz)   06/13/21 0500 38.7 kg (85 lb 5.1 oz)   06/13/21 0300 38.7 kg (85 lb 5.1 oz)   06/12/21 0500 38.5 kg (84 lb 14 oz)   06/10/21 0614 38.2 kg (84 lb 3.5 oz)   06/08/21 0616 32.1 kg (70 lb 12.3 oz)   06/07/21 0500 33 kg (72 lb 12 oz)   06/06/21 2300 33 kg (72 lb 12 oz)   06/06/21 1705 34 kg (74 lb 15.3 oz)   06/06/21 1430 34 kg (74 lb 15.3 oz)        BMI kg/m2 Body mass index is 19.83 kg/m².       Labs/Medications         Pertinent Labs -   Results from last 7 days   Lab Units 06/12/21  0546 06/11/21  0502   SODIUM mmol/L 144 141   POTASSIUM mmol/L 3.6 3.9   CHLORIDE mmol/L 113* 112*   CO2 mmol/L 21.3* 19.2*   BUN mg/dL 35* 33*   CREATININE mg/dL 0.89 1.04*   CALCIUM mg/dL 8.3* 8.6   BILIRUBIN mg/dL  --  0.2   ALK PHOS U/L  --  57   ALT (SGPT) U/L  --  11   AST (SGOT) U/L  --  18   GLUCOSE mg/dL 101* 85     Results from last 7 days   Lab Units 06/12/21  0546   HEMOGLOBIN g/dL 10.9*   HEMATOCRIT % 36.1     No results found for: COVID19  Lab Results   Component Value Date    HGBA1C 5.10 06/07/2021         Pertinent Medications Reviewed.     Malnutrition Severity Assessment      Patient meets criteria for : Severe Malnutrition       --  Current Nutrition Orders & Evaluation of Intake "       Oral Nutrition     Current PO Diet Diet Regular   Supplement Thrive BID   PO Evaluation     % PO Intake 25-50%   --  Nutrition Diagnosis         Nutrition Dx Problem 1 Severe malnutrition related to inadequate energy Intake as evidenced by muscle wasting., fat loss., inadequate energy intake., decreased appetite., patient report. and family report.       Intervention Goal         Intervention Goal(s) 50% intake of meals and supplements     Nutrition Intervention        RD Medical Nutrition Therapy for wt gain, Thrive BID   --  Monitor/Evaluation        Monitor PO intake, Supplement intake, Pertinent labs, Weight     Electronically signed by:  Henrietta Rivera RD  06/14/21 14:28 EDT

## 2021-06-14 NOTE — PLAN OF CARE
Goal Outcome Evaluation:  Oral nutrition supplements being provided. Intake is improving.

## 2021-06-14 NOTE — PLAN OF CARE
Goal Outcome Evaluation:  Plan of Care Reviewed With: patient        Progress: improving  Outcome Summary: pt awake and alert with intermittent confusion. pt up to bsc x1 assist tolerates well. appetite improved. vss

## 2021-06-15 LAB
ABO GROUP BLD: NORMAL
ABO GROUP BLD: NORMAL
ALBUMIN SERPL-MCNC: 2.6 G/DL (ref 3.5–5.2)
ALBUMIN/GLOB SERPL: 1.4 G/DL
ALP SERPL-CCNC: 55 U/L (ref 39–117)
ALT SERPL W P-5'-P-CCNC: 17 U/L (ref 1–33)
ANION GAP SERPL CALCULATED.3IONS-SCNC: 6.8 MMOL/L (ref 5–15)
AST SERPL-CCNC: 22 U/L (ref 1–32)
BASOPHILS # BLD AUTO: 0.03 10*3/MM3 (ref 0–0.2)
BASOPHILS NFR BLD AUTO: 0.5 % (ref 0–1.5)
BILIRUB SERPL-MCNC: 0.2 MG/DL (ref 0–1.2)
BLD GP AB SCN SERPL QL: NEGATIVE
BUN SERPL-MCNC: 22 MG/DL (ref 8–23)
BUN/CREAT SERPL: 32.4 (ref 7–25)
CALCIUM SPEC-SCNC: 8.2 MG/DL (ref 8.6–10.5)
CHLORIDE SERPL-SCNC: 113 MMOL/L (ref 98–107)
CO2 SERPL-SCNC: 22.2 MMOL/L (ref 22–29)
CREAT SERPL-MCNC: 0.68 MG/DL (ref 0.57–1)
DEPRECATED RDW RBC AUTO: 49.3 FL (ref 37–54)
EOSINOPHIL # BLD AUTO: 0.1 10*3/MM3 (ref 0–0.4)
EOSINOPHIL NFR BLD AUTO: 1.6 % (ref 0.3–6.2)
ERYTHROCYTE [DISTWIDTH] IN BLOOD BY AUTOMATED COUNT: 15.3 % (ref 12.3–15.4)
GFR SERPL CREATININE-BSD FRML MDRD: 82 ML/MIN/1.73
GLOBULIN UR ELPH-MCNC: 1.9 GM/DL
GLUCOSE SERPL-MCNC: 84 MG/DL (ref 65–99)
HCT VFR BLD AUTO: 24.9 % (ref 34–46.6)
HGB BLD-MCNC: 7.7 G/DL (ref 12–15.9)
IMM GRANULOCYTES # BLD AUTO: 0.03 10*3/MM3 (ref 0–0.05)
IMM GRANULOCYTES NFR BLD AUTO: 0.5 % (ref 0–0.5)
LYMPHOCYTES # BLD AUTO: 1.96 10*3/MM3 (ref 0.7–3.1)
LYMPHOCYTES NFR BLD AUTO: 30.6 % (ref 19.6–45.3)
MCH RBC QN AUTO: 27.7 PG (ref 26.6–33)
MCHC RBC AUTO-ENTMCNC: 30.9 G/DL (ref 31.5–35.7)
MCV RBC AUTO: 89.6 FL (ref 79–97)
MONOCYTES # BLD AUTO: 0.56 10*3/MM3 (ref 0.1–0.9)
MONOCYTES NFR BLD AUTO: 8.8 % (ref 5–12)
NEUTROPHILS NFR BLD AUTO: 3.72 10*3/MM3 (ref 1.7–7)
NEUTROPHILS NFR BLD AUTO: 58 % (ref 42.7–76)
NRBC BLD AUTO-RTO: 0 /100 WBC (ref 0–0.2)
PLATELET # BLD AUTO: 158 10*3/MM3 (ref 140–450)
PMV BLD AUTO: 10.1 FL (ref 6–12)
POTASSIUM SERPL-SCNC: 3.6 MMOL/L (ref 3.5–5.2)
PROT SERPL-MCNC: 4.5 G/DL (ref 6–8.5)
RBC # BLD AUTO: 2.78 10*6/MM3 (ref 3.77–5.28)
RH BLD: POSITIVE
RH BLD: POSITIVE
SODIUM SERPL-SCNC: 142 MMOL/L (ref 136–145)
T&S EXPIRATION DATE: NORMAL
WBC # BLD AUTO: 6.4 10*3/MM3 (ref 3.4–10.8)

## 2021-06-15 PROCEDURE — 94799 UNLISTED PULMONARY SVC/PX: CPT

## 2021-06-15 PROCEDURE — 86850 RBC ANTIBODY SCREEN: CPT | Performed by: INTERNAL MEDICINE

## 2021-06-15 PROCEDURE — 80053 COMPREHEN METABOLIC PANEL: CPT | Performed by: INTERNAL MEDICINE

## 2021-06-15 PROCEDURE — 85025 COMPLETE CBC W/AUTO DIFF WBC: CPT | Performed by: INTERNAL MEDICINE

## 2021-06-15 PROCEDURE — 86900 BLOOD TYPING SEROLOGIC ABO: CPT

## 2021-06-15 PROCEDURE — 86901 BLOOD TYPING SEROLOGIC RH(D): CPT

## 2021-06-15 PROCEDURE — 86901 BLOOD TYPING SEROLOGIC RH(D): CPT | Performed by: INTERNAL MEDICINE

## 2021-06-15 PROCEDURE — 36430 TRANSFUSION BLD/BLD COMPNT: CPT

## 2021-06-15 PROCEDURE — P9016 RBC LEUKOCYTES REDUCED: HCPCS

## 2021-06-15 PROCEDURE — 86923 COMPATIBILITY TEST ELECTRIC: CPT

## 2021-06-15 PROCEDURE — 63710000001 DRONABINOL PER 2.5 MG: Performed by: INTERNAL MEDICINE

## 2021-06-15 PROCEDURE — 86900 BLOOD TYPING SEROLOGIC ABO: CPT | Performed by: INTERNAL MEDICINE

## 2021-06-15 RX ORDER — PANTOPRAZOLE SODIUM 40 MG/10ML
40 INJECTION, POWDER, LYOPHILIZED, FOR SOLUTION INTRAVENOUS
Status: DISCONTINUED | OUTPATIENT
Start: 2021-06-15 | End: 2021-06-18 | Stop reason: HOSPADM

## 2021-06-15 RX ADMIN — IPRATROPIUM BROMIDE AND ALBUTEROL SULFATE 3 ML: .5; 2.5 SOLUTION RESPIRATORY (INHALATION) at 08:06

## 2021-06-15 RX ADMIN — GUAIFENESIN 600 MG: 600 TABLET, EXTENDED RELEASE ORAL at 20:52

## 2021-06-15 RX ADMIN — GUAIFENESIN 600 MG: 600 TABLET, EXTENDED RELEASE ORAL at 08:36

## 2021-06-15 RX ADMIN — PANTOPRAZOLE SODIUM 40 MG: 40 INJECTION, POWDER, FOR SOLUTION INTRAVENOUS at 10:55

## 2021-06-15 RX ADMIN — METOPROLOL SUCCINATE 25 MG: 25 TABLET, EXTENDED RELEASE ORAL at 08:36

## 2021-06-15 RX ADMIN — IPRATROPIUM BROMIDE AND ALBUTEROL SULFATE 3 ML: .5; 2.5 SOLUTION RESPIRATORY (INHALATION) at 12:20

## 2021-06-15 RX ADMIN — SODIUM CHLORIDE, PRESERVATIVE FREE 10 ML: 5 INJECTION INTRAVENOUS at 08:39

## 2021-06-15 RX ADMIN — DRONABINOL 5 MG: 2.5 CAPSULE ORAL at 08:36

## 2021-06-15 RX ADMIN — FAMOTIDINE 40 MG: 20 TABLET, FILM COATED ORAL at 08:36

## 2021-06-15 RX ADMIN — BUDESONIDE 0.5 MG: 0.5 INHALANT ORAL at 08:05

## 2021-06-15 RX ADMIN — ARFORMOTEROL TARTRATE 15 MCG: 15 SOLUTION RESPIRATORY (INHALATION) at 08:05

## 2021-06-15 RX ADMIN — SODIUM CHLORIDE, PRESERVATIVE FREE 10 ML: 5 INJECTION INTRAVENOUS at 20:53

## 2021-06-15 RX ADMIN — LOSARTAN POTASSIUM 100 MG: 50 TABLET, FILM COATED ORAL at 08:36

## 2021-06-15 RX ADMIN — ARFORMOTEROL TARTRATE 15 MCG: 15 SOLUTION RESPIRATORY (INHALATION) at 19:47

## 2021-06-15 RX ADMIN — DOCUSATE SODIUM 50MG AND SENNOSIDES 8.6MG 2 TABLET: 8.6; 5 TABLET, FILM COATED ORAL at 08:36

## 2021-06-15 RX ADMIN — IPRATROPIUM BROMIDE AND ALBUTEROL SULFATE 3 ML: .5; 2.5 SOLUTION RESPIRATORY (INHALATION) at 00:17

## 2021-06-15 RX ADMIN — DRONABINOL 5 MG: 2.5 CAPSULE ORAL at 20:52

## 2021-06-15 RX ADMIN — DOCUSATE SODIUM 50MG AND SENNOSIDES 8.6MG 2 TABLET: 8.6; 5 TABLET, FILM COATED ORAL at 20:52

## 2021-06-15 RX ADMIN — IPRATROPIUM BROMIDE AND ALBUTEROL SULFATE 3 ML: .5; 2.5 SOLUTION RESPIRATORY (INHALATION) at 19:47

## 2021-06-15 RX ADMIN — BUDESONIDE 0.5 MG: 0.5 INHALANT ORAL at 19:48

## 2021-06-15 RX ADMIN — APIXABAN 2.5 MG: 2.5 TABLET, FILM COATED ORAL at 08:36

## 2021-06-15 NOTE — PROGRESS NOTES
[facility]     Progress Note    Patient Name: Elsa Terry  : 1935  MRN: [id number]  Primary Care Physician:  Joaquin Oliveira MD  Date of admission: 2021    Subjective   Subjective     Chief Complaint: Patient noted to be anemic and passed some blood in stool earlier this morning    HPI:  Patient Reports patient does not report any problems.  Denies any abdomen pain no vomiting    Review of Systems   Review of Systems   Constitutional: Activity change: walking with PT.   Respiratory: Shortness of breath: Denies.    Cardiovascular: Negative for chest pain and leg swelling.   Gastrointestinal: Positive for blood in stool. Negative for nausea and vomiting.   Neurological: Positive for weakness.   Psychiatric/Behavioral: Positive for confusion.   All other systems reviewed and are negative.      Objective   Objective     Vitals:   Temp:  [97.5 °F (36.4 °C)-98.3 °F (36.8 °C)] 98.3 °F (36.8 °C)  Heart Rate:  [65-88] 83  Resp:  [16-18] 16  BP: (109-150)/(47-58) 125/49  Physical Exam    Constitutional: Awake, alert, confused occasionally   Eyes: PERRLA, sclerae anicteric, no conjunctival injection   HENT: NCAT, mucous membranes moist   Neck: Supple, no thyromegaly, no lymphadenopathy, trachea midline   Respiratory: Clear to auscultation bilaterally, nonlabored respirations    Cardiovascular: RRR, no murmurs, rubs, or gallops, palpable pedal pulses bilaterally   Gastrointestinal: Positive bowel sounds, soft, nontender, nondistended   Musculoskeletal: No bilateral ankle edema, no clubbing or cyanosis to extremities   Psychiatric: Appropriate affect, cooperative   Neurologic: Oriented x 3, strength symmetric in all extremities, she has generalized weakness,Cranial Nerves grossly intact to confrontation, speech clear   Skin: No rashes     Result Review    Result Review:  I have personally reviewed the results from the time of this admission to 6/15/2021 14:06 EDT and agree with these findings:  [x]   Laboratory  []  Microbiology  []  Radiology  []  EKG/Telemetry   []  Cardiology/Vascular   []  Pathology  []  Old records  []  Other:  Most notable findings include: Anemia today and blood in stool    Assessment/Plan   Assessment / Plan     Brief Patient Summary:  Elsa Terry is a 85 y.o. female who was admitted with severe hypoxia and COPD exacerbation.  She was found to have a right upper lobe mass.  She had some bleeding in the stool today and anemia.  She has had low iron and it was replaced.    Active Hospital Problems:  Active Hospital Problems    Diagnosis    • **COPD with hypoxia (CMS/HCC)    • Iron deficiency anemia, unspecified    • Severe malnutrition (CMS/HCC)    • Mass of upper lobe of right lung    • Abnormal pleural fluid      Small right pleural fluid     • Nicotine dependence    • Impaired attitude towards nutritional status    • Pneumonia due to infectious organism    • HTN (hypertension)      Plan:   She was started on Protonix.  She will be typed and crossmatched and transfused 2 units of packed red cells today.  GI consult Dr. Loyola requested.  Will monitor CBC in a.m.    DVT prophylaxis:  Medical DVT prophylaxis orders are present.    CODE STATUS:   Level Of Support Discussed With: Patient  Code Status: CPR  Medical Interventions (Level of Support Prior to Arrest): Full      Electronically signed by Joaquin Oliveira MD, 06/15/21, 2:06 PM EDT.

## 2021-06-15 NOTE — SIGNIFICANT NOTE
06/15/21 1008   Plan   Provided Post Acute Provider List? Yes   Post Acute Provider List Inpatient Rehab;Nursing Home   Provided Post Acute Provider Quality & Resource List? Yes   Post Acute Provider Quality and Resource List Inpatient Rehab   Delivered To Support Person   Support Person Son Pieter   Method of Delivery Telephone   Patient/Family in Agreement with Plan yes   Final Note Pts precert has been approved at [facility]. Pt has had both covid vaccinations. No covid test needed at this time. SW notified MD. Bed is ready when medically stable to discharge. SW placed call to pts son-Pieter and provided update. Pieter is agreeable to plan.

## 2021-06-15 NOTE — PLAN OF CARE
Goal Outcome Evaluation:  Plan of Care Reviewed With: patient           Outcome Summary: Patient awake and alert, will make occasional confused statements. Patient receiving 2 units PRBC's today, had bowl movement today, no blood noted.

## 2021-06-15 NOTE — PLAN OF CARE
Goal Outcome Evaluation:  Plan of Care Reviewed With: patient        Progress: improving   Patient is alert and oriented with intermittent confusion throughout the night, vital signs stable. Umu Lilly RN

## 2021-06-15 NOTE — PROGRESS NOTES
[facility]     Progress Note    Patient Name: Elsa Terry  : 1935  MRN: [id number]  Primary Care Physician:  Joaquin Oliveira MD  Date of admission: 2021    Subjective   Subjective     Chief Complaint: intermittently confused  Room air oxygen saturation 95%  School placement in the [facility] for rehab  Ambulates with the rehab  Not in apparent respiratory distress      HPI:  Patient Reports none clinically significant  Done well with IV antibiotics      Review of Systems Denies any fever feels fatigued weight loss which she cannot quantitate  HEENT: Bilateral lens implants no nasal congestion no epistaxis  Respiratory system: Scoliotic spine with good air entry bilaterally with decreased sounds in the bases wheezing on and off  Cardiovascular system: Regular rhythm no chest pain or palpitations noted  Gastrointestinal system: No abdominal pain nausea vomiting or constipation at this time  Genitourinary system: Denies dysuria hesitancy  Musculoskeletal system: Generalized weakness bilaterally  Endocrine: Easily fatigues  Hematology: No bleeding bruising or swollen glands  Psychiatric: History of anxiety  Neurologic: Easily arousable and can communicate with full sentences generalized weakness bilaterally  Skin: No edema or rash noted       Objective   Objective     Vitals:   Temp:  [97.5 °F (36.4 °C)-98.2 °F (36.8 °C)] 97.6 °F (36.4 °C)  Heart Rate:  [65-86] 65  Resp:  [16-20] 18  BP: (120-137)/(50-58) 120/58  Physical Exam:   Constitutional: Awake, alert   Eyes: PERRLA, sclerae anicteric, no conjunctival injection   HENT: NCAT, mucous membranes moist   Neck: Supple, no thyromegaly, no lymphadenopathy, trachea midline   Respiratory: Clear to auscultation bilaterally, nonlabored respirations    Cardiovascular: RRR, no murmurs, rubs, or gallops, palpable pedal pulses bilaterally   Gastrointestinal: Positive bowel sounds, soft, nontender, nondistended   Musculoskeletal: No  bilateral ankle edema, no clubbing or cyanosis to extremities   Psychiatric: Appropriate affect, cooperative   Neurologic: Oriented x 3, strength symmetric in all extremities, Cranial Nerves grossly intact to confrontation, speech clear   Skin: No rashes    Result Review    Result Review:  I have personally reviewed the results from the time of this admission to 6/15/2021 05:33 EDT and agree with these findings:  [x]  Laboratory  []  Microbiology  []  Radiology  []  EKG/Telemetry   []  Cardiology/Vascular   []  Pathology  []  Old records  []  Other:  Most notable findings include: BUN and creatinine 22/0.68, GFR 82  Hemoglobin and hematocrit: 7/24-type and crossmatch packed RBCs ordered  Hemoccult fecal positive    Assessment/Plan   Assessment / Plan     Brief Patient Summary:  Elsa Terry is a 85 y.o. female who possible placement into nursing home and rehab facility  Possible lower GI bleed fecal Hemoccult positive with hemoglobin trait 7/24  Type and crossmatch packed RBC and transfuse per primary MD with a follow-up hemoglobin and hematocrit posttransfusion  Active Hospital Problems:  Active Hospital Problems    Diagnosis    • **COPD with hypoxia (CMS/HCC)    • Iron deficiency anemia, unspecified    • Severe malnutrition (CMS/HCC)    • Mass of upper lobe of right lung    • Abnormal pleural fluid      Small right pleural fluid     • Nicotine dependence    • Impaired attitude towards nutritional status    • Pneumonia due to infectious organism    • HTN (hypertension)      Plan:   Possible lower GI bleed fecal Hemoccult positive with hemoglobin trait 7/24  Type and crossmatch packed RBC and transfuse per primary MD with a follow-up hemoglobin and hematocrit posttransfusion    COPD with hypoxia (CMS/HCC): Continue duo nebs Brovana and Pulmicort  On room air oxygen saturation 95%     Mass of upper lobe of right lung: consider diagnostic procedure when stable                 Abnormal pleural fluid: small right  pleural fluid will tap when symptomatic currently no active intervention at this time                 Nicotine dependence: Continue Habitrol 21 mg patch daily                 Impaired attitude towards nutritional status: Encourage nutrition continue Marinol orally          Pneumonia due to infectious organism: Sputum for Gram stain cultures and sensitivity  Continue with IV Zosyn empirically          HTN (hypertension): Continue losartan       DVT prophylaxis:  Medical DVT prophylaxis orders are present.    CODE STATUS:   Level Of Support Discussed With: Patient  Code Status: CPR  Medical Interventions (Level of Support Prior to Arrest): Full    Disposition: Per Primary MD.    Electronically signed by Jonathan Oliveira MD, 06/15/21, 5:33 AM EDT.

## 2021-06-16 LAB
BASOPHILS # BLD AUTO: 0.03 10*3/MM3 (ref 0–0.2)
BASOPHILS NFR BLD AUTO: 0.5 % (ref 0–1.5)
DEPRECATED RDW RBC AUTO: 47.8 FL (ref 37–54)
EOSINOPHIL # BLD AUTO: 0.12 10*3/MM3 (ref 0–0.4)
EOSINOPHIL NFR BLD AUTO: 2 % (ref 0.3–6.2)
ERYTHROCYTE [DISTWIDTH] IN BLOOD BY AUTOMATED COUNT: 15 % (ref 12.3–15.4)
FERRITIN SERPL-MCNC: 807.6 NG/ML (ref 13–150)
HCT VFR BLD AUTO: 34.6 % (ref 34–46.6)
HGB BLD-MCNC: 11.3 G/DL (ref 12–15.9)
IMM GRANULOCYTES # BLD AUTO: 0.02 10*3/MM3 (ref 0–0.05)
IMM GRANULOCYTES NFR BLD AUTO: 0.3 % (ref 0–0.5)
IRON 24H UR-MRATE: 106 MCG/DL (ref 37–145)
IRON SATN MFR SERPL: 40 % (ref 20–50)
LYMPHOCYTES # BLD AUTO: 1.95 10*3/MM3 (ref 0.7–3.1)
LYMPHOCYTES NFR BLD AUTO: 32.9 % (ref 19.6–45.3)
MCH RBC QN AUTO: 28.7 PG (ref 26.6–33)
MCHC RBC AUTO-ENTMCNC: 32.7 G/DL (ref 31.5–35.7)
MCV RBC AUTO: 87.8 FL (ref 79–97)
MONOCYTES # BLD AUTO: 0.55 10*3/MM3 (ref 0.1–0.9)
MONOCYTES NFR BLD AUTO: 9.3 % (ref 5–12)
NEUTROPHILS NFR BLD AUTO: 3.26 10*3/MM3 (ref 1.7–7)
NEUTROPHILS NFR BLD AUTO: 55 % (ref 42.7–76)
NRBC BLD AUTO-RTO: 0 /100 WBC (ref 0–0.2)
PLATELET # BLD AUTO: 150 10*3/MM3 (ref 140–450)
PMV BLD AUTO: 10.1 FL (ref 6–12)
RBC # BLD AUTO: 3.94 10*6/MM3 (ref 3.77–5.28)
TIBC SERPL-MCNC: 264 MCG/DL (ref 298–536)
TRANSFERRIN SERPL-MCNC: 177 MG/DL (ref 200–360)
WBC # BLD AUTO: 5.93 10*3/MM3 (ref 3.4–10.8)

## 2021-06-16 PROCEDURE — 85025 COMPLETE CBC W/AUTO DIFF WBC: CPT | Performed by: INTERNAL MEDICINE

## 2021-06-16 PROCEDURE — 84466 ASSAY OF TRANSFERRIN: CPT | Performed by: INTERNAL MEDICINE

## 2021-06-16 PROCEDURE — 94799 UNLISTED PULMONARY SVC/PX: CPT

## 2021-06-16 PROCEDURE — 63710000001 DRONABINOL PER 2.5 MG: Performed by: INTERNAL MEDICINE

## 2021-06-16 PROCEDURE — 83540 ASSAY OF IRON: CPT | Performed by: INTERNAL MEDICINE

## 2021-06-16 PROCEDURE — 99222 1ST HOSP IP/OBS MODERATE 55: CPT | Performed by: INTERNAL MEDICINE

## 2021-06-16 PROCEDURE — 82728 ASSAY OF FERRITIN: CPT | Performed by: INTERNAL MEDICINE

## 2021-06-16 RX ADMIN — SODIUM CHLORIDE, PRESERVATIVE FREE 10 ML: 5 INJECTION INTRAVENOUS at 08:17

## 2021-06-16 RX ADMIN — BUDESONIDE 0.5 MG: 0.5 INHALANT ORAL at 08:10

## 2021-06-16 RX ADMIN — IPRATROPIUM BROMIDE AND ALBUTEROL SULFATE 3 ML: .5; 2.5 SOLUTION RESPIRATORY (INHALATION) at 12:35

## 2021-06-16 RX ADMIN — IPRATROPIUM BROMIDE AND ALBUTEROL SULFATE 3 ML: .5; 2.5 SOLUTION RESPIRATORY (INHALATION) at 19:16

## 2021-06-16 RX ADMIN — ARFORMOTEROL TARTRATE 15 MCG: 15 SOLUTION RESPIRATORY (INHALATION) at 08:09

## 2021-06-16 RX ADMIN — DRONABINOL 5 MG: 2.5 CAPSULE ORAL at 08:20

## 2021-06-16 RX ADMIN — LOSARTAN POTASSIUM 100 MG: 50 TABLET, FILM COATED ORAL at 08:21

## 2021-06-16 RX ADMIN — Medication 5 MG: at 20:07

## 2021-06-16 RX ADMIN — BUDESONIDE 0.5 MG: 0.5 INHALANT ORAL at 19:16

## 2021-06-16 RX ADMIN — IPRATROPIUM BROMIDE AND ALBUTEROL SULFATE 3 ML: .5; 2.5 SOLUTION RESPIRATORY (INHALATION) at 08:09

## 2021-06-16 RX ADMIN — PANTOPRAZOLE SODIUM 40 MG: 40 INJECTION, POWDER, FOR SOLUTION INTRAVENOUS at 05:56

## 2021-06-16 RX ADMIN — DOCUSATE SODIUM 50MG AND SENNOSIDES 8.6MG 2 TABLET: 8.6; 5 TABLET, FILM COATED ORAL at 20:07

## 2021-06-16 RX ADMIN — GUAIFENESIN 600 MG: 600 TABLET, EXTENDED RELEASE ORAL at 08:21

## 2021-06-16 RX ADMIN — DRONABINOL 5 MG: 2.5 CAPSULE ORAL at 20:07

## 2021-06-16 RX ADMIN — METOPROLOL SUCCINATE 25 MG: 25 TABLET, EXTENDED RELEASE ORAL at 08:21

## 2021-06-16 RX ADMIN — GUAIFENESIN 600 MG: 600 TABLET, EXTENDED RELEASE ORAL at 20:10

## 2021-06-16 RX ADMIN — SODIUM CHLORIDE, PRESERVATIVE FREE 10 ML: 5 INJECTION INTRAVENOUS at 20:07

## 2021-06-16 RX ADMIN — ARFORMOTEROL TARTRATE 15 MCG: 15 SOLUTION RESPIRATORY (INHALATION) at 19:16

## 2021-06-16 NOTE — PROGRESS NOTES
[facility]     Progress Note    Patient Name: Elsa Terry  : 1935  MRN: [id number]  Primary Care Physician:  Joaquin Oliveira MD  Date of admission: 2021    Subjective   Subjective     Chief Complaint:  Weakness,breathing better.    HPI:  Patient Reports  That she was seen by . Denies any bleeding.    Review of Systems   Review of Systems   Constitutional: Positive for fatigue. Activity change: walking with therapy.   HENT: Negative.    Eyes: Negative.    Respiratory: Negative for cough and wheezing. Shortness of breath: only on exertion.    Cardiovascular: Negative for chest pain and palpitations.   Gastrointestinal: Negative for abdominal pain. Blood in stool: no blood today.   Musculoskeletal: Negative for arthralgias.   Neurological: Positive for weakness.   Hematological: Negative.  Negative for adenopathy. Does not bruise/bleed easily.   Psychiatric/Behavioral: Positive for confusion.   All other systems reviewed and are negative.      Objective   Objective     Vitals:   Temp:  [97.16 °F (36.2 °C)-98.6 °F (37 °C)] 97.16 °F (36.2 °C)  Heart Rate:  [64-87] 65  Resp:  [14-18] 18  BP: (133-158)/(53-82) 153/58  Physical Exam    Constitutional: Awake, alert,talking   Eyes: PERRLA, sclerae anicteric, no conjunctival injection   HENT: NCAT, mucous membranes moist   Neck: Supple, no thyromegaly, no lymphadenopathy, trachea midline   Respiratory: Clear to auscultation bilaterally, nonlabored respirations    Cardiovascular: RRR, no murmurs, rubs, or gallops, palpable pedal pulses bilaterally   Gastrointestinal: Positive bowel sounds, soft, nontender, nondistended   Musculoskeletal: No bilateral ankle edema, no clubbing or cyanosis to extremities   Psychiatric: Appropriate affect, cooperative,occasional confusion   Neurologic: Oriented x 3, strength symmetric in all extremities, Cranial Nerves grossly intact to confrontation, speech clear   Skin: No rashes     Result Review    Result  Review:  I have personally reviewed the results from the time of this admission to 6/16/2021 14:56 EDT and agree with these findings:  [x]  Laboratory  []  Microbiology  []  Radiology  []  EKG/Telemetry   []  Cardiology/Vascular   []  Pathology  []  Old records  []  Other:  Most notable findings include: Anemia due to bleeding,improved with transfusion and starting Protonix. Held Eliquis.    Assessment/Plan   Assessment / Plan     Brief Patient Summary:  Elsa Terry is a 85 y.o. female who had Anemia and bleeding. Seen by . Because of her age and friality she did not want endoscopies. Restart Eliquis if no further bleeding.    Active Hospital Problems:  Active Hospital Problems    Diagnosis    • **COPD with hypoxia (CMS/HCC)    • Iron deficiency anemia, unspecified    • Severe malnutrition (CMS/HCC)    • Mass of upper lobe of right lung    • Abnormal pleural fluid      Small right pleural fluid     • Nicotine dependence    • Impaired attitude towards nutritional status    • Pneumonia due to infectious organism    • HTN (hypertension)      Plan:    Will check cbc in am. Monitor for bleeding. Restart Eliquis in am. PT ambulating patient.    DVT prophylaxis:  Medical DVT prophylaxis orders are present.    CODE STATUS:   Level Of Support Discussed With: Patient  Code Status: CPR  Medical Interventions (Level of Support Prior to Arrest): Full      Electronically signed by Joaquin Oliveira MD, 06/16/21, 2:56 PM EDT.

## 2021-06-16 NOTE — PROGRESS NOTES
[facility]     Progress Note    Patient Name: Elsa Terry  : 1935  MRN: [id number]  Primary Care Physician:  Joauqin Oliveira MD  Date of admission: 2021    Subjective   Subjective     Chief Complaint: Rested well  Received 2 units packed RBC  Post RBC transfusion hemoglobin hematocrit 11/34  Room air saturation 96%  Not in apparent respiratory distress      HPI:  Patient Reports none clinically significant  Done well with 2 units packed RBC transfusion    Review of Systems Denies any fever feels fatigued weight loss which she cannot quantitate  HEENT: Bilateral lens implants no nasal congestion no epistaxis  Respiratory system: Scoliotic spine with good air entry bilaterally with decreased sounds in the bases wheezing on and off  Cardiovascular system: Regular rhythm no chest pain or palpitations noted  Gastrointestinal system: No abdominal pain nausea vomiting or constipation at this time  Genitourinary system: Denies dysuria hesitancy  Musculoskeletal system: Generalized weakness bilaterally  Endocrine: Easily fatigues  Hematology: No bleeding bruising or swollen glands  Psychiatric: History of anxiety  Neurologic: Easily arousable and can communicate with full sentences generalized weakness bilaterally  Skin: No edema or rash noted       Objective   Objective     Vitals:   Temp:  [97.6 °F (36.4 °C)-98.6 °F (37 °C)] 97.6 °F (36.4 °C)  Heart Rate:  [70-88] 72  Resp:  [14-18] 16  BP: (109-157)/(47-82) 153/58  Physical Exam:   Constitutional: Awake, alert   Eyes: PERRLA, sclerae anicteric, no conjunctival injection   HENT: NCAT, mucous membranes moist   Neck: Supple, no thyromegaly, no lymphadenopathy, trachea midline   Respiratory: Clear to auscultation bilaterally, nonlabored respirations    Cardiovascular: RRR, no murmurs, rubs, or gallops, palpable pedal pulses bilaterally   Gastrointestinal: Positive bowel sounds, soft, nontender, nondistended   Musculoskeletal: No bilateral ankle edema,  no clubbing or cyanosis to extremities   Psychiatric: Appropriate affect, cooperative   Neurologic: Oriented x 3, strength symmetric in all extremities, Cranial Nerves grossly intact to confrontation, speech clear   Skin: No rashes    Result Review    Result Review:  I have personally reviewed the results from the time of this admission to 6/16/2021 06:04 EDT and agree with these findings:  [x]  Laboratory  []  Microbiology  []  Radiology  []  EKG/Telemetry   []  Cardiology/Vascular   []  Pathology  []  Old records  []  Other:  Most notable findings include: Posttransfusion hemoglobin hematocrit 11/34      Assessment/Plan   Assessment / Plan     Brief Patient Summary:  Elsa Terry is a 85 y.o. female who possible placement into [facility] and rehab facility  Posttransfusion hemoglobin and hematocrit after 2 units packed RBCs 11/34    Active Hospital Problems    Diagnosis    • **COPD with hypoxia (CMS/HCC)    • Iron deficiency anemia, unspecified    • Severe malnutrition (CMS/HCC)    • Mass of upper lobe of right lung    • Abnormal pleural fluid      Small right pleural fluid     • Nicotine dependence    • Impaired attitude towards nutritional status    • Pneumonia due to infectious organism    • HTN (hypertension)      Plan: Possible lower GI bleed fecal Hemoccult positive with hemoglobin trait 7/24  Post 2 packed RBC transfusion hemoglobin hematocrit is 11/24    COPD with hypoxia (CMS/HCC): Continue duo nebs Brovana and Pulmicort  On room air oxygen saturation 95%     Mass of upper lobe of right lung: consider diagnostic procedure when stable                 Abnormal pleural fluid: small right pleural fluid will tap when symptomatic currently no active intervention at this time                 Nicotine dependence: Continue Habitrol 21 mg patch daily                 Impaired attitude towards nutritional status: Encourage nutrition continue Marinol orally          Pneumonia due to infectious  organism: Sputum for Gram stain cultures and sensitivity  Continue with IV Zosyn empirically          HTN (hypertension): Continue losartan       DVT prophylaxis:  Medical DVT prophylaxis orders are present.    CODE STATUS:   Level Of Support Discussed With: Patient  Code Status: CPR  Medical Interventions (Level of Support Prior to Arrest): Full    Disposition: Per Primary MD.    Electronically signed by Jonathan Oliveira MD, 06/16/21, 6:04 AM EDT.

## 2021-06-16 NOTE — PLAN OF CARE
Problem: Adult Inpatient Plan of Care  Goal: Patient-Specific Goal (Individualized)  6/16/2021 1652 by Cinda Pierre RN  Outcome: Ongoing, Progressing  Flowsheets (Taken 6/16/2021 1649)  Patient-Specific Goals (Include Timeframe): Patient hoping to be discharged soon.  6/16/2021 1651 by Cinda Pierre RN  Outcome: Ongoing, Progressing  Flowsheets (Taken 6/16/2021 1649)  Patient-Specific Goals (Include Timeframe): Patient hoping to be discharged soon.  6/16/2021 1649 by Cinda Pierre RN  Outcome: Ongoing, Progressing  6/16/2021 1649 by Cinda Pierre RN  Outcome: Ongoing, Progressing  Flowsheets (Taken 6/16/2021 1649)  Patient-Specific Goals (Include Timeframe): Patient hoping to be discharged soon.  6/16/2021 1648 by Cinda Pierre RN  Outcome: Ongoing, Progressing  6/16/2021 1647 by Cinda Pierre RN  Outcome: Ongoing, Progressing   Goal Outcome Evaluation:  Plan of Care Reviewed With: patient           Outcome Summary: Patient awake and alert, will make occasional confused statements. Patient receiving 2 units PRBC's today, had bowl movement today, no blood noted.

## 2021-06-16 NOTE — CONSULTS
[facility]  Initial Inpatient Consult Note    Referring Provider: dr. roa    Reason for Consultation: iron deficiency anemia    Subjective     History of present illness:    85 y.o. female admitted with pneumonia, lung lesion and  Found to have iron deficiency anemia.  She denies any overt GI bleeding.  She does have a remote h/o colon cancer and prior surgery with dr. Bhatt but has not had colonoscopy in several years by her account.  She denies abdominal pain, nausea, vomiting.  She had been taking eliquis as an outpatient.    Past Medical History:  Past Medical History:   Diagnosis Date   • Abnormal pleural fluid    • Colon cancer (CMS/HCC)    • COPD (chronic obstructive pulmonary disease) (CMS/HCC)    • DVT (deep venous thrombosis) (CMS/HCC)    • Elevated cholesterol    • Hypertension    • Mass of upper lobe of right lung    • Osteoporosis    • Pneumonia      Past Surgical History:  Past Surgical History:   Procedure Laterality Date   • BREAST SURGERY      Lumpectomy   • CATARACT EXTRACTION     • COLON SURGERY     • HERNIA REPAIR     • HIP ARTHROPLASTY     • JOINT REPLACEMENT      Right Hip ORIF approx 2010   • TONSILLECTOMY     • TUBAL ABDOMINAL LIGATION        Social History:   Social History     Tobacco Use   • Smoking status: Current Every Day Smoker     Packs/day: 0.25     Types: Cigarettes     Start date: 6/7/1954   • Smokeless tobacco: Never Used   • Tobacco comment: Patient unable to understand at this time   Substance Use Topics   • Alcohol use: Not Currently      Family History:  Family History   Problem Relation Age of Onset   • Heart disease Mother    • Heart disease Father    • Cancer Sister        Home Meds:  Medications Prior to Admission   Medication Sig Dispense Refill Last Dose   • apixaban (Eliquis) 2.5 MG tablet tablet Every 12 (Twelve) Hours.      • furosemide (Lasix) 20 MG tablet Take 20 mg by mouth Take As Directed. M,W,F      • losartan (COZAAR) 100 MG  tablet Daily.   Unknown at Unknown time   • megestrol (MEGACE) 40 MG/ML suspension Take 20 mg by mouth Daily.   Unknown at Unknown time   • metoprolol succinate XL (TOPROL-XL) 25 MG 24 hr tablet Take 25 mg by mouth 2 (two) times a day.   Unknown at Unknown time     Current Meds:   arformoterol, 15 mcg, Nebulization, BID - RT  budesonide, 0.5 mg, Nebulization, BID - RT  dronabinol, 5 mg, Oral, BID  guaiFENesin, 600 mg, Oral, Q12H  ipratropium-albuterol, 3 mL, Nebulization, 4x Daily - RT  lidocaine, 1 patch, Transdermal, Q24H  losartan, 100 mg, Oral, Q24H  metoprolol succinate XL, 25 mg, Oral, Q24H  nicotine, 1 patch, Transdermal, Q24H  pantoprazole, 40 mg, Intravenous, Q AM  senna-docusate sodium, 2 tablet, Oral, BID  sodium chloride, 10 mL, Intravenous, Q12H      Allergies:  Allergies   Allergen Reactions   • Influenza Vaccines Anaphylaxis   • Megestrol Itching     Review of Systems  Pertinent items are noted in HPI, all other systems reviewed and negative         Vital Signs  Temp:  [97.16 °F (36.2 °C)-98.6 °F (37 °C)] 97.16 °F (36.2 °C)  Heart Rate:  [72-87] 77  Resp:  [14-18] 18  BP: (122-158)/(49-82) 158/67  Physical Exam:  General Appearance:    Alert, cooperative, in no acute distress   Head:    Normocephalic, without obvious abnormality, atraumatic   Eyes:          conjunctivae and sclerae normal, no   icterus   Throat:   no thrush, oral mucosa moist   Neck:   Supple, no adenopathy   Lungs:     Clear to auscultation bilaterally    Heart:    Regular rhythm and normal rate    Chest Wall:    No abnormalities observed   Abdomen:     Soft, nondistended, nontender; normal bowel sounds   Extremities:   no edema, no redness   Skin:   No bruising or rash   Psychiatric:  normal mood and insight     Results Review:  [x]  Laboratory   []  Radiology  []  Pathology      I reviewed the patient's new clinical results.    Results from last 7 days   Lab Units 06/16/21  0458 06/15/21  0356 06/12/21  0546   WBC 10*3/mm3 5.93  6.40 13.62*   HEMOGLOBIN g/dL 11.3* 7.7* 10.9*   HEMATOCRIT % 34.6 24.9* 36.1   PLATELETS 10*3/mm3 150 158 238     Results from last 7 days   Lab Units 06/15/21  0356 06/12/21  0546 06/11/21  0502   SODIUM mmol/L 142 144 141   POTASSIUM mmol/L 3.6 3.6 3.9   CHLORIDE mmol/L 113* 113* 112*   CO2 mmol/L 22.2 21.3* 19.2*   BUN mg/dL 22 35* 33*   CREATININE mg/dL 0.68 0.89 1.04*   CALCIUM mg/dL 8.2* 8.3* 8.6   BILIRUBIN mg/dL 0.2  --  0.2   ALK PHOS U/L 55  --  57   ALT (SGPT) U/L 17  --  11   AST (SGOT) U/L 22  --  18   GLUCOSE mg/dL 84 101* 85         No results found for: LIPASE    Radiology:  XR Chest PA & Lateral   Final Result      CT Chest With Contrast Diagnostic   Final Result      XR Chest 1 View   Final Result           Assessment/Plan     Patient Active Problem List   Diagnosis   • Pneumonia due to infectious organism   • HTN (hypertension)   • Mass of upper lobe of right lung   • Abnormal pleural fluid   • COPD with hypoxia (CMS/HCC)   • Nicotine dependence   • Impaired attitude towards nutritional status   • Severe malnutrition (CMS/HCC)   • Iron deficiency anemia, unspecified        Plan:  We will continue to monitor her hgb and transfuse as needed with iron replacement however the pt does not wish to pursue any further endoscopy given her age and comorbidities which I think is reasonable.  If she continues to trend downward then I would discontinue the eliquis and use asa only.  I discussed risks and benefits of endoscopy and pt prefers to defer.      I discussed the patients findings and my recommendations with patient.    Sd Loyola MD

## 2021-06-16 NOTE — PLAN OF CARE
Goal Outcome Evaluation:  Plan of Care Reviewed With: patient           Outcome Summary: PT NOT USING OR IN NEED OF BIPAP

## 2021-06-17 LAB
ALBUMIN SERPL-MCNC: 2.8 G/DL (ref 3.5–5.2)
ALBUMIN/GLOB SERPL: 1.2 G/DL
ALP SERPL-CCNC: 68 U/L (ref 39–117)
ALT SERPL W P-5'-P-CCNC: 22 U/L (ref 1–33)
ANION GAP SERPL CALCULATED.3IONS-SCNC: 8.9 MMOL/L (ref 5–15)
AST SERPL-CCNC: 29 U/L (ref 1–32)
BASOPHILS # BLD AUTO: 0.05 10*3/MM3 (ref 0–0.2)
BASOPHILS NFR BLD AUTO: 0.8 % (ref 0–1.5)
BH BB BLOOD EXPIRATION DATE: NORMAL
BH BB BLOOD EXPIRATION DATE: NORMAL
BH BB BLOOD TYPE BARCODE: 6200
BH BB BLOOD TYPE BARCODE: 6200
BH BB DISPENSE STATUS: NORMAL
BH BB DISPENSE STATUS: NORMAL
BH BB PRODUCT CODE: NORMAL
BH BB PRODUCT CODE: NORMAL
BH BB UNIT NUMBER: NORMAL
BH BB UNIT NUMBER: NORMAL
BILIRUB SERPL-MCNC: 0.6 MG/DL (ref 0–1.2)
BUN SERPL-MCNC: 11 MG/DL (ref 8–23)
BUN/CREAT SERPL: 15.5 (ref 7–25)
CALCIUM SPEC-SCNC: 8.2 MG/DL (ref 8.6–10.5)
CHLORIDE SERPL-SCNC: 106 MMOL/L (ref 98–107)
CO2 SERPL-SCNC: 20.1 MMOL/L (ref 22–29)
CREAT SERPL-MCNC: 0.71 MG/DL (ref 0.57–1)
CROSSMATCH INTERPRETATION: NORMAL
CROSSMATCH INTERPRETATION: NORMAL
DEPRECATED RDW RBC AUTO: 50.6 FL (ref 37–54)
EOSINOPHIL # BLD AUTO: 0.12 10*3/MM3 (ref 0–0.4)
EOSINOPHIL NFR BLD AUTO: 1.9 % (ref 0.3–6.2)
ERYTHROCYTE [DISTWIDTH] IN BLOOD BY AUTOMATED COUNT: 15.9 % (ref 12.3–15.4)
GFR SERPL CREATININE-BSD FRML MDRD: 78 ML/MIN/1.73
GLOBULIN UR ELPH-MCNC: 2.4 GM/DL
GLUCOSE SERPL-MCNC: 73 MG/DL (ref 65–99)
HCT VFR BLD AUTO: 38.6 % (ref 34–46.6)
HGB BLD-MCNC: 12.4 G/DL (ref 12–15.9)
IMM GRANULOCYTES # BLD AUTO: 0.03 10*3/MM3 (ref 0–0.05)
IMM GRANULOCYTES NFR BLD AUTO: 0.5 % (ref 0–0.5)
LYMPHOCYTES # BLD AUTO: 1.74 10*3/MM3 (ref 0.7–3.1)
LYMPHOCYTES NFR BLD AUTO: 27.8 % (ref 19.6–45.3)
MCH RBC QN AUTO: 29.4 PG (ref 26.6–33)
MCHC RBC AUTO-ENTMCNC: 32.1 G/DL (ref 31.5–35.7)
MCV RBC AUTO: 91.5 FL (ref 79–97)
MONOCYTES # BLD AUTO: 0.45 10*3/MM3 (ref 0.1–0.9)
MONOCYTES NFR BLD AUTO: 7.2 % (ref 5–12)
NEUTROPHILS NFR BLD AUTO: 3.86 10*3/MM3 (ref 1.7–7)
NEUTROPHILS NFR BLD AUTO: 61.8 % (ref 42.7–76)
NRBC BLD AUTO-RTO: 0 /100 WBC (ref 0–0.2)
PLATELET # BLD AUTO: 166 10*3/MM3 (ref 140–450)
PMV BLD AUTO: 9.7 FL (ref 6–12)
POTASSIUM SERPL-SCNC: 3.8 MMOL/L (ref 3.5–5.2)
PROT SERPL-MCNC: 5.2 G/DL (ref 6–8.5)
RBC # BLD AUTO: 4.22 10*6/MM3 (ref 3.77–5.28)
SODIUM SERPL-SCNC: 135 MMOL/L (ref 136–145)
UNIT  ABO: NORMAL
UNIT  ABO: NORMAL
UNIT  RH: NORMAL
UNIT  RH: NORMAL
WBC # BLD AUTO: 6.25 10*3/MM3 (ref 3.4–10.8)

## 2021-06-17 PROCEDURE — 94799 UNLISTED PULMONARY SVC/PX: CPT

## 2021-06-17 PROCEDURE — 85025 COMPLETE CBC W/AUTO DIFF WBC: CPT | Performed by: INTERNAL MEDICINE

## 2021-06-17 PROCEDURE — 63710000001 DRONABINOL PER 2.5 MG: Performed by: INTERNAL MEDICINE

## 2021-06-17 PROCEDURE — 80053 COMPREHEN METABOLIC PANEL: CPT | Performed by: INTERNAL MEDICINE

## 2021-06-17 RX ORDER — ASPIRIN 81 MG/1
81 TABLET ORAL DAILY
Status: DISCONTINUED | OUTPATIENT
Start: 2021-06-18 | End: 2021-06-18 | Stop reason: HOSPADM

## 2021-06-17 RX ADMIN — METOPROLOL SUCCINATE 25 MG: 25 TABLET, EXTENDED RELEASE ORAL at 09:27

## 2021-06-17 RX ADMIN — APIXABAN 2.5 MG: 2.5 TABLET, FILM COATED ORAL at 09:10

## 2021-06-17 RX ADMIN — IPRATROPIUM BROMIDE AND ALBUTEROL SULFATE 3 ML: .5; 2.5 SOLUTION RESPIRATORY (INHALATION) at 12:07

## 2021-06-17 RX ADMIN — GUAIFENESIN 600 MG: 600 TABLET, EXTENDED RELEASE ORAL at 09:13

## 2021-06-17 RX ADMIN — DRONABINOL 5 MG: 2.5 CAPSULE ORAL at 09:28

## 2021-06-17 RX ADMIN — LOSARTAN POTASSIUM 100 MG: 50 TABLET, FILM COATED ORAL at 09:09

## 2021-06-17 RX ADMIN — DOCUSATE SODIUM 50MG AND SENNOSIDES 8.6MG 2 TABLET: 8.6; 5 TABLET, FILM COATED ORAL at 20:46

## 2021-06-17 RX ADMIN — BUDESONIDE 0.5 MG: 0.5 INHALANT ORAL at 06:52

## 2021-06-17 RX ADMIN — APIXABAN 2.5 MG: 2.5 TABLET, FILM COATED ORAL at 09:27

## 2021-06-17 RX ADMIN — SODIUM CHLORIDE, PRESERVATIVE FREE 10 ML: 5 INJECTION INTRAVENOUS at 09:31

## 2021-06-17 RX ADMIN — IPRATROPIUM BROMIDE AND ALBUTEROL SULFATE 3 ML: .5; 2.5 SOLUTION RESPIRATORY (INHALATION) at 06:52

## 2021-06-17 RX ADMIN — PANTOPRAZOLE SODIUM 40 MG: 40 INJECTION, POWDER, FOR SOLUTION INTRAVENOUS at 06:30

## 2021-06-17 RX ADMIN — IPRATROPIUM BROMIDE AND ALBUTEROL SULFATE 3 ML: .5; 2.5 SOLUTION RESPIRATORY (INHALATION) at 18:55

## 2021-06-17 RX ADMIN — ARFORMOTEROL TARTRATE 15 MCG: 15 SOLUTION RESPIRATORY (INHALATION) at 06:52

## 2021-06-17 RX ADMIN — DOCUSATE SODIUM 50MG AND SENNOSIDES 8.6MG 2 TABLET: 8.6; 5 TABLET, FILM COATED ORAL at 09:28

## 2021-06-17 RX ADMIN — GUAIFENESIN 600 MG: 600 TABLET, EXTENDED RELEASE ORAL at 20:46

## 2021-06-17 RX ADMIN — IPRATROPIUM BROMIDE AND ALBUTEROL SULFATE 3 ML: .5; 2.5 SOLUTION RESPIRATORY (INHALATION) at 00:20

## 2021-06-17 RX ADMIN — DRONABINOL 5 MG: 2.5 CAPSULE ORAL at 09:13

## 2021-06-17 RX ADMIN — BUDESONIDE 0.5 MG: 0.5 INHALANT ORAL at 18:55

## 2021-06-17 RX ADMIN — GUAIFENESIN 600 MG: 600 TABLET, EXTENDED RELEASE ORAL at 09:28

## 2021-06-17 RX ADMIN — SODIUM CHLORIDE, PRESERVATIVE FREE 10 ML: 5 INJECTION INTRAVENOUS at 20:47

## 2021-06-17 RX ADMIN — LOSARTAN POTASSIUM 100 MG: 50 TABLET, FILM COATED ORAL at 09:30

## 2021-06-17 RX ADMIN — DRONABINOL 5 MG: 2.5 CAPSULE ORAL at 20:46

## 2021-06-17 RX ADMIN — ARFORMOTEROL TARTRATE 15 MCG: 15 SOLUTION RESPIRATORY (INHALATION) at 18:55

## 2021-06-17 RX ADMIN — METOPROLOL SUCCINATE 25 MG: 25 TABLET, EXTENDED RELEASE ORAL at 09:10

## 2021-06-17 NOTE — PROGRESS NOTES
[facility]     Progress Note    Patient Name: Elsa Terry  : 1935  MRN: [id number]  Primary Care Physician:  Joaquin Oliveira MD  Date of admission: 2021    Subjective   Subjective     Chief Complaint: Status post 2 unit packed RBC transfusion  Follow-up hemoglobin hematocrit serially  Patient did not want to pursue endoscopy  On room air saturation 95%  Possible disposition to nursing home  Patient ambulates with physical therapy  Not in acute respiratory distress  HPI:  Patient Reports none clinically significant  Done well with 2 units packed RBC transfusion    Review of Systems Denies any fever feels fatigued weight loss which she cannot quantitate  HEENT: Bilateral lens implants no nasal congestion no epistaxis  Respiratory system: Scoliotic spine with good air entry bilaterally with decreased sounds in the bases wheezing on and off  Cardiovascular system: Regular rhythm no chest pain or palpitations noted  Gastrointestinal system: No abdominal pain nausea vomiting or constipation at this time  Genitourinary system: Denies dysuria hesitancy  Musculoskeletal system: Generalized weakness bilaterally  Endocrine: Easily fatigues  Hematology: No bleeding bruising or swollen glands  Psychiatric: History of anxiety  Neurologic: Easily arousable and can communicate with full sentences generalized weakness bilaterally  Skin: No edema or rash noted       Objective   Objective     Vitals:   Temp:  [97.16 °F (36.2 °C)-98.2 °F (36.8 °C)] 97.7 °F (36.5 °C)  Heart Rate:  [64-80] 77  Resp:  [16-18] 18  BP: (131-158)/(57-67) 155/67  Physical Exam:   Constitutional: Awake, alert   Eyes: PERRLA, sclerae anicteric, no conjunctival injection   HENT: NCAT, mucous membranes moist   Neck: Supple, no thyromegaly, no lymphadenopathy, trachea midline   Respiratory: Clear to auscultation bilaterally, nonlabored respirations    Cardiovascular: RRR, no murmurs, rubs, or gallops, palpable pedal pulses  bilaterally   Gastrointestinal: Positive bowel sounds, soft, nontender, nondistended   Musculoskeletal: No bilateral ankle edema, no clubbing or cyanosis to extremities   Psychiatric: Appropriate affect, cooperative   Neurologic: Oriented x 3, strength symmetric in all extremities, Cranial Nerves grossly intact to confrontation, speech clear   Skin: No rashes    Result Review    Result Review:  I have personally reviewed the results from the time of this admission to 6/17/2021 05:49 EDT and agree with these findings:  [x]  Laboratory  []  Microbiology  []  Radiology  []  EKG/Telemetry   []  Cardiology/Vascular   []  Pathology  []  Old records  []  Other:  Most notable findings include: Follow-up hemoglobin hematocrit pending  Refused endoscopy  Pending placement into nursing home and rehab  Assessment/Plan   Assessment / Plan     Brief Patient Summary:  Elsa Terry is a 85 y.o. female possible placement into [facility] and rehab facility  Repeat hemoglobinand hematocrit result pending  Active Hospital Problems:  Active Hospital Problems    Diagnosis    • **COPD with hypoxia (CMS/HCC)    • Iron deficiency anemia, unspecified    • Severe malnutrition (CMS/HCC)    • Mass of upper lobe of right lung    • Abnormal pleural fluid      Small right pleural fluid     • Nicotine dependence    • Impaired attitude towards nutritional status    • Pneumonia due to infectious organism    • HTN (hypertension)      Plan: Possible lower GI bleed fecal Hemoccult positive with   Post transfusion 2 packed RBC transfusion hemoglobin hematocrit is 11/24  Refused endoscopy  GI recommends aspirin instead of Eliquis if hemoglobin cleared continues to drop    COPD with hypoxia (CMS/HCC): Continue duo nebs Brovana and Pulmicort  On room air oxygen saturation 95%     Mass of upper lobe of right lung: consider diagnostic procedure when stable                 Abnormal pleural fluid: small right pleural fluid will tap when  symptomatic currently no active intervention at this time                 Nicotine dependence: Continue Habitrol 21 mg patch daily                 Impaired attitude towards nutritional status: Encourage nutrition continue Marinol orally          Pneumonia due to infectious organism: Continue IV Zosyn to auto stop    HTN (hypertension): Continue losartan       DVT prophylaxis:  Medical DVT prophylaxis orders are present.    CODE STATUS:   Level Of Support Discussed With: Patient  Code Status: CPR  Medical Interventions (Level of Support Prior to Arrest): Full    Disposition: Per Primary MD.    Electronically signed by Jonathan Oliveira MD, 06/17/21, 5:49 AM EDT.

## 2021-06-17 NOTE — PROGRESS NOTES
[facility]     Progress Note    Patient Name: Elsa Terry  : 1935  MRN: [id number]  Primary Care Physician:  Joaquin Oliveira MD  Date of admission: 2021    Subjective   Subjective     Chief Complaint: Weakness.  No bleeding    HPI:  Patient Reports her breathing is good.  She has not had any more bleeding    Review of Systems   Review of Systems   Constitutional: Negative for fever. Activity change: mostly in bed. She walks with PT.   HENT: Negative.    Eyes: Negative.    Respiratory: Negative for cough, shortness of breath and wheezing.    Cardiovascular: Negative for chest pain.   Gastrointestinal: Negative for abdominal pain and blood in stool.   Genitourinary: Negative.    Musculoskeletal: Positive for arthralgias.   Neurological: Positive for weakness.   Psychiatric/Behavioral: Positive for confusion.   All other systems reviewed and are negative.      Objective   Objective     Vitals:   Temp:  [97.2 °F (36.2 °C)-98.2 °F (36.8 °C)] 97.7 °F (36.5 °C)  Heart Rate:  [66-96] 72  Resp:  [16-18] 17  BP: (129-190)/(55-84) 129/55  Flow (L/min):  [0] 0  Physical Exam    Constitutional: Awake, alert,Oriented.Thin female with kyphosis   Eyes: PERRLA, sclerae anicteric, no conjunctival injection   HENT: NCAT, mucous membranes moist   Neck: Supple, no thyromegaly, no lymphadenopathy, trachea midline   Respiratory: Clear to auscultation bilaterally, nonlabored respirations    Cardiovascular: RRR, no murmurs, rubs, or gallops, palpable pedal pulses bilaterally   Gastrointestinal: Positive bowel sounds, soft, nontender, nondistended   Musculoskeletal: No bilateral ankle edema, no clubbing or cyanosis to extremities.Generalized weakness   Psychiatric: Appropriate affect, cooperative   Neurologic: Oriented x 3, strength symmetric in all extremities, Cranial Nerves grossly intact to confrontation, speech clear   Skin: No rashes     Result Review    Result Review:  I have personally reviewed the results from  the time of this admission to 6/17/2021 19:30 EDT and agree with these findings:  [x]  Laboratory  []  Microbiology  []  Radiology  []  EKG/Telemetry   []  Cardiology/Vascular   []  Pathology  []  Old records  []  Other:  Most notable findings include: CBC is normal after the transfusion    Assessment/Plan   Assessment / Plan     Brief Patient Summary:  Elsa Terry is a 85 y.o. female who had an episode of bleeding.  She was seen by GI but because of her age and frailty no scopes were done.  She did not want to have any.  She received 2 units of packed cells.  She was started back on Eliquis but per GI he wants her to not take the Eliquis but rather to be started on aspirin.    Active Hospital Problems:  Active Hospital Problems    Diagnosis    • **COPD with hypoxia (CMS/HCC)    • Iron deficiency anemia, unspecified    • Severe malnutrition (CMS/HCC)    • Mass of upper lobe of right lung    • Abnormal pleural fluid      Small right pleural fluid     • Nicotine dependence    • Impaired attitude towards nutritional status    • Pneumonia due to infectious organism    • HTN (hypertension)      Plan: We will stop the Eliquis tablet.  We will start her on aspirin from a.m. recheck CBC in a.m. She will be discharged to [facility] and rehab in a.m.       DVT prophylaxis:  Medical DVT prophylaxis orders are present.    CODE STATUS:   Level Of Support Discussed With: Patient  Code Status: CPR  Medical Interventions (Level of Support Prior to Arrest): Full      Electronically signed by Joaquin Oliveira MD, 06/17/21, 7:30 PM EDT.

## 2021-06-18 VITALS
BODY MASS INDEX: 18.55 KG/M2 | OXYGEN SATURATION: 95 % | HEIGHT: 56 IN | HEART RATE: 75 BPM | WEIGHT: 82.45 LBS | RESPIRATION RATE: 18 BRPM | SYSTOLIC BLOOD PRESSURE: 135 MMHG | DIASTOLIC BLOOD PRESSURE: 56 MMHG | TEMPERATURE: 97.2 F

## 2021-06-18 PROBLEM — J18.9 PNEUMONIA DUE TO INFECTIOUS ORGANISM: Status: RESOLVED | Noted: 2021-06-06 | Resolved: 2021-06-18

## 2021-06-18 PROCEDURE — 94799 UNLISTED PULMONARY SVC/PX: CPT

## 2021-06-18 PROCEDURE — 63710000001 DRONABINOL PER 2.5 MG: Performed by: INTERNAL MEDICINE

## 2021-06-18 RX ORDER — BUDESONIDE 0.5 MG/2ML
0.5 INHALANT ORAL
Qty: 120 ML | Refills: 2 | Status: SHIPPED | OUTPATIENT
Start: 2021-06-18

## 2021-06-18 RX ORDER — ARFORMOTEROL TARTRATE 15 UG/2ML
15 SOLUTION RESPIRATORY (INHALATION)
Qty: 120 ML | Refills: 2 | Status: SHIPPED | OUTPATIENT
Start: 2021-06-18

## 2021-06-18 RX ORDER — PANTOPRAZOLE SODIUM 40 MG/1
40 TABLET, DELAYED RELEASE ORAL DAILY
Qty: 30 TABLET | Refills: 2 | Status: SHIPPED | OUTPATIENT
Start: 2021-06-18

## 2021-06-18 RX ORDER — ASPIRIN 81 MG/1
81 TABLET ORAL DAILY
Qty: 30 TABLET | Refills: 2 | Status: SHIPPED | OUTPATIENT
Start: 2021-06-18

## 2021-06-18 RX ORDER — CHOLECALCIFEROL (VITAMIN D3) 125 MCG
5 CAPSULE ORAL NIGHTLY PRN
Qty: 30 TABLET | Refills: 2 | Status: SHIPPED | OUTPATIENT
Start: 2021-06-18

## 2021-06-18 RX ORDER — NITROGLYCERIN 0.4 MG/1
0.4 TABLET SUBLINGUAL
Qty: 30 TABLET | Refills: 12 | Status: SHIPPED | OUTPATIENT
Start: 2021-06-18

## 2021-06-18 RX ORDER — LIDOCAINE 50 MG/G
1 PATCH TOPICAL
Qty: 30 PATCH | Refills: 2 | Status: SHIPPED | OUTPATIENT
Start: 2021-06-18

## 2021-06-18 RX ORDER — BISACODYL 5 MG/1
5 TABLET, DELAYED RELEASE ORAL DAILY PRN
Qty: 30 TABLET | Refills: 2 | Status: SHIPPED | OUTPATIENT
Start: 2021-06-18

## 2021-06-18 RX ORDER — DRONABINOL 5 MG/1
5 CAPSULE ORAL 2 TIMES DAILY
Qty: 7 CAPSULE | Refills: 0 | Status: SHIPPED | OUTPATIENT
Start: 2021-06-18 | End: 2021-06-22

## 2021-06-18 RX ORDER — POLYETHYLENE GLYCOL 3350 17 G/17G
17 POWDER, FOR SOLUTION ORAL DAILY PRN
Qty: 30 PACKET | Refills: 2 | Status: SHIPPED | OUTPATIENT
Start: 2021-06-18

## 2021-06-18 RX ORDER — AMOXICILLIN 250 MG
2 CAPSULE ORAL 2 TIMES DAILY
Qty: 30 TABLET | Refills: 2 | Status: SHIPPED | OUTPATIENT
Start: 2021-06-18

## 2021-06-18 RX ORDER — GUAIFENESIN 600 MG/1
600 TABLET, EXTENDED RELEASE ORAL EVERY 12 HOURS SCHEDULED
Qty: 60 TABLET | Refills: 2 | Status: SHIPPED | OUTPATIENT
Start: 2021-06-18

## 2021-06-18 RX ORDER — BISACODYL 10 MG
10 SUPPOSITORY, RECTAL RECTAL DAILY PRN
Qty: 30 SUPPOSITORY | Refills: 2 | Status: SHIPPED | OUTPATIENT
Start: 2021-06-18

## 2021-06-18 RX ORDER — NICOTINE 21 MG/24HR
1 PATCH, TRANSDERMAL 24 HOURS TRANSDERMAL
Qty: 30 PATCH | Refills: 2 | Status: SHIPPED | OUTPATIENT
Start: 2021-06-18

## 2021-06-18 RX ORDER — IPRATROPIUM BROMIDE AND ALBUTEROL SULFATE 2.5; .5 MG/3ML; MG/3ML
3 SOLUTION RESPIRATORY (INHALATION)
Qty: 360 ML | Refills: 2 | Status: SHIPPED | OUTPATIENT
Start: 2021-06-18

## 2021-06-18 RX ORDER — ONDANSETRON 8 MG/1
8 TABLET, ORALLY DISINTEGRATING ORAL EVERY 6 HOURS PRN
Qty: 60 TABLET | Refills: 2 | Status: SHIPPED | OUTPATIENT
Start: 2021-06-18

## 2021-06-18 RX ADMIN — IPRATROPIUM BROMIDE AND ALBUTEROL SULFATE 3 ML: .5; 2.5 SOLUTION RESPIRATORY (INHALATION) at 00:09

## 2021-06-18 RX ADMIN — GUAIFENESIN 600 MG: 600 TABLET, EXTENDED RELEASE ORAL at 09:44

## 2021-06-18 RX ADMIN — METOPROLOL SUCCINATE 25 MG: 25 TABLET, EXTENDED RELEASE ORAL at 09:44

## 2021-06-18 RX ADMIN — LOSARTAN POTASSIUM 100 MG: 50 TABLET, FILM COATED ORAL at 09:44

## 2021-06-18 RX ADMIN — ASPIRIN 81 MG: 81 TABLET, COATED ORAL at 09:44

## 2021-06-18 RX ADMIN — SODIUM CHLORIDE, PRESERVATIVE FREE 10 ML: 5 INJECTION INTRAVENOUS at 09:45

## 2021-06-18 RX ADMIN — DRONABINOL 5 MG: 2.5 CAPSULE ORAL at 09:45

## 2021-06-18 RX ADMIN — DOCUSATE SODIUM 50MG AND SENNOSIDES 8.6MG 2 TABLET: 8.6; 5 TABLET, FILM COATED ORAL at 09:45

## 2021-06-18 RX ADMIN — PANTOPRAZOLE SODIUM 40 MG: 40 INJECTION, POWDER, FOR SOLUTION INTRAVENOUS at 05:51

## 2021-06-18 NOTE — DISCHARGE SUMMARY
[facility]         DISCHARGE SUMMARY    Patient Name: Elsa Terry  : 1935  MRN: [id number]    Date of Admission: 2021  Date of Discharge: 2021  Primary Care Physician: Joaquin Oliveira MD    Consults     Date and Time Order Name Status Description    2021  8:38 AM Inpatient Gastroenterology Consult Completed     2021  9:00 AM Inpatient Pulmonology Consult      2021 11:05 PM Inpatient Pulmonology Consult      2021  7:25 PM Inpatient Hospitalist Consult Completed           Presenting Problem:   Lung mass [R91.8]  Hypoxia [R09.02]  Pneumonia due to infectious organism, unspecified laterality, unspecified part of lung [J18.9]    Active and Resolved Hospital Problems:  Active Hospital Problems    Diagnosis POA   • **COPD with hypoxia (CMS/HCC) [J44.9, R09.02] Unknown   • Iron deficiency anemia, unspecified [D50.9] Yes   • Severe malnutrition (CMS/HCC) [E43] Yes   • Mass of upper lobe of right lung [R91.8] Unknown   • Abnormal pleural fluid [R89.9] Unknown   • Nicotine dependence [F17.200] Unknown   • Impaired attitude towards nutritional status [Z91.89] Not Applicable   • HTN (hypertension) [I10] Unknown      Resolved Hospital Problems    Diagnosis POA   • Pneumonia due to infectious organism [J18.9] Yes   • SOB (shortness of breath) [R06.02] Unknown   • History of pulmonary embolism [Z86.711] Unknown         Hospital Course     Hospital Course:  Elsa Terry is a 85 y.o. female Who was admitted with progressive shortness of breath and cough.  She was hypoxic and confused and removing Venturi mask.  She tolerated BiPAP.  On work-up found to have pneumonia and started on IV antibiotics BiPAP nebulizers.  Pulmonary consult Dr. NAOMI Oliveira was obtained.  There was also 2.3 cm spiculated nodule in the right upper lobe suspicious for malignancy.  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  She has not been eating and was started on Marinol.   She has been anemic and iron studies were found to be low.  Mental status improved after admission.  Solu-Medrol was tapered and stopped.  Discussed with family.  Because of her weakness, frailty, hypoxia and age it was decided not to pursue lung biopsy.  Her breathing improved in a few days.  Physical therapy worked with her and she was able to walk with a rolling walker.  Hypoxia improved.  She has noted to be anemic and she had blood in her stool.  GI consult Dr. Loyola obtained.  She did not want to have endoscopies because of her age.  Started on p.o. Protonix and Eliquis was stopped.  She was transfused 2 units of packed red cells.  Eliquis was restarted but discontinued after discussing with Dr. Loyola.  She will be continued on aspirin enteric-coated 81 mg daily instead.  She will be discharged to [facility] and rehab on 6/18/2021.        DISCHARGE Follow Up Recommendations for labs and diagnostics: Follow-up in office in 2 weeks      Day of Discharge     Vital Signs:  Temp:  [97.2 °F (36.2 °C)-98.4 °F (36.9 °C)] 97.5 °F (36.4 °C)  Heart Rate:  [72-81] 73  Resp:  [16-18] 18  BP: (129-160)/(55-71) 158/57  Physical Exam:  Constitutional: Awake, alert, oriented to person and place but confused occasionally   Eyes: PERRLA, sclerae anicteric, no conjunctival injection   HENT: NCAT, mucous membranes moist   Neck: Supple, no thyromegaly, no lymphadenopathy, trachea midline   Respiratory: Clear to auscultation bilaterally, nonlabored respirations .  She has kyphosis.   Cardiovascular: RRR, no murmurs, rubs, or gallops, palpable pedal pulses bilaterally   Gastrointestinal: Positive bowel sounds, soft, nontender, nondistended   Musculoskeletal: No bilateral ankle edema, no clubbing or cyanosis to extremities   Psychiatric: Appropriate affect, cooperative   Neurologic: Oriented x 3, strength symmetric in all extremities, Cranial Nerves grossly intact to confrontation, speech clear   Skin: No rashes        Pertinent  and/or Most Recent Results     LAB RESULTS:      Lab 06/17/21  0537 06/16/21  0458 06/15/21  0356 06/12/21  0546   WBC 6.25 5.93 6.40 13.62*   HEMOGLOBIN 12.4 11.3* 7.7* 10.9*   HEMATOCRIT 38.6 34.6 24.9* 36.1   PLATELETS 166 150 158 238   NEUTROS ABS 3.86 3.26 3.72 10.43*   IMMATURE GRANS (ABS) 0.03 0.02 0.03 0.06*   LYMPHS ABS 1.74 1.95 1.96 2.49   MONOS ABS 0.45 0.55 0.56 0.61   EOS ABS 0.12 0.12 0.10 0.01   MCV 91.5 87.8 89.6 89.8         Lab 06/17/21  0537 06/15/21  0356 06/12/21  0546   SODIUM 135* 142 144   POTASSIUM 3.8 3.6 3.6   CHLORIDE 106 113* 113*   CO2 20.1* 22.2 21.3*   ANION GAP 8.9 6.8 9.7   BUN 11 22 35*   CREATININE 0.71 0.68 0.89   GLUCOSE 73 84 101*   CALCIUM 8.2* 8.2* 8.3*         Lab 06/17/21  0537 06/15/21  0356   TOTAL PROTEIN 5.2* 4.5*   ALBUMIN 2.80* 2.60*   GLOBULIN 2.4 1.9   ALT (SGPT) 22 17   AST (SGOT) 29 22   BILIRUBIN 0.6 0.2   ALK PHOS 68 55                 Lab 06/16/21  0458 06/15/21  0610   IRON 106  --    IRON SATURATION 40  --    TIBC 264*  --    TRANSFERRIN 177*  --    FERRITIN 807.60*  --    ABO TYPING  --  A   RH TYPING  --  Positive   ANTIBODY SCREEN  --  Negative         Brief Urine Lab Results  (Last result in the past 365 days)      Color   Clarity   Blood   Leuk Est   Nitrite   Protein   CREAT   Urine HCG        02/25/21 1345   CLEAR NEGATIVE NEGATIVE  Comment:  URINE MICROSCOPICS:    Only performed if any of the following are present:    Appearance is Sl Cloudy to Turbid; Protein is    30 to >/= 300 mg/dL; Occult Blood, Nitrite, or Leukocyte    Esterase is positive. Color is Red or Orange.   NEGATIVE               Microbiology Results (last 10 days)     ** No results found for the last 240 hours. **                           Labs Pending at Discharge:        Discharge Details        Discharge Medications      New Medications      Instructions Start Date   arformoterol 15 MCG/2ML nebulizer solution  Commonly known as: BROVANA   15 mcg, Nebulization, 2  Times Daily - RT      aspirin 81 MG EC tablet   81 mg, Oral, Daily      bisacodyl 5 MG EC tablet  Commonly known as: DULCOLAX   5 mg, Oral, Daily PRN      bisacodyl 10 MG suppository  Commonly known as: DULCOLAX   10 mg, Rectal, Daily PRN      budesonide 0.5 MG/2ML nebulizer solution  Commonly known as: PULMICORT   0.5 mg, Nebulization, 2 Times Daily - RT      dronabinol 5 MG capsule  Commonly known as: MARINOL   5 mg, Oral, 2 Times Daily      guaiFENesin 600 MG 12 hr tablet  Commonly known as: MUCINEX   600 mg, Oral, Every 12 Hours Scheduled      ipratropium-albuterol 0.5-2.5 mg/3 ml nebulizer  Commonly known as: DUO-NEB   3 mL, Nebulization, 4 Times Daily - RT      lidocaine 5 %  Commonly known as: LIDODERM   1 patch, Transdermal, Every 24 Hours Scheduled, Remove & Discard patch within 12 hours or as directed by MD      melatonin 5 MG tablet tablet   5 mg, Oral, Nightly PRN      nicotine 14 MG/24HR patch  Commonly known as: NICODERM CQ   1 patch, Transdermal, Every 24 Hours Scheduled      nitroglycerin 0.4 MG SL tablet  Commonly known as: NITROSTAT   0.4 mg, Sublingual, Every 5 Minutes PRN, Take no more than 3 doses in 15 minutes.      ondansetron ODT 8 MG disintegrating tablet  Commonly known as: ZOFRAN-ODT   8 mg, Translingual, Every 6 Hours PRN      pantoprazole 40 MG EC tablet  Commonly known as: PROTONIX   40 mg, Oral, Daily      polyethylene glycol 17 g packet  Commonly known as: MIRALAX   17 g, Oral, Daily PRN      sennosides-docusate 8.6-50 MG per tablet  Commonly known as: PERICOLACE   2 tablets, Oral, 2 Times Daily         Continue These Medications      Instructions Start Date   losartan 100 MG tablet  Commonly known as: COZAAR   Every 24 Hours      metoprolol succinate XL 25 MG 24 hr tablet  Commonly known as: TOPROL-XL   25 mg, Oral, 2 times daily         Stop These Medications    Eliquis 2.5 MG tablet tablet  Generic drug: apixaban     Lasix 20 MG tablet  Generic drug: furosemide     megestrol 40  MG/ML suspension  Commonly known as: MEGACE            Allergies   Allergen Reactions   • Influenza Vaccines Anaphylaxis   • Megestrol Itching         Discharge Disposition: [place] [facility]  Rehab Facility or Unit (DC - External)    Diet:  Hospital:  Diet Order   Procedures   • Diet Regular         Discharge Activity: As tolerated        CODE STATUS:  Code Status and Medical Interventions:   Ordered at: 06/06/21 5489     Level Of Support Discussed With:    Patient     Code Status:    CPR     Medical Interventions (Level of Support Prior to Arrest):    Full         No future appointments.  Follow-up in office after discharge from rehab in 2 weeks          Electronically signed by Joaquin Oliveira MD, 06/17/21, 8:07 PM EDT.

## 2021-06-18 NOTE — PROGRESS NOTES
[facility]     Progress Note    Patient Name: Elsa Terry  : 1935  MRN: [id number]  Primary Care Physician:  Joaquin Oliveira MD  Date of admission: 2021    Subjective   Subjective     Chief Complaint: No acute respiratory complaints  BUN/creatinine and hemoglobin hematocrit from 2021 reviewed  Possible disposition to nursing home today      HPI:  Patient Reports Reports none clinically significant  Hemoglobin hematocrit from today pending  Possible disposition to nursing home today  Eliquis discontinued aspirin to be started  Review of Systems Denies any fever feels fatigued weight loss which she cannot quantitate  HEENT: Bilateral lens implants no nasal congestion no epistaxis  Respiratory system: Scoliotic spine with good air entry bilaterally with decreased sounds in the bases wheezing on and off  Cardiovascular system: Regular rhythm no chest pain or palpitations noted  Gastrointestinal system: No abdominal pain nausea vomiting or constipation at this time  Genitourinary system: Denies dysuria hesitancy  Musculoskeletal system: Generalized weakness bilaterally  Endocrine: Easily fatigues  Hematology: No bleeding bruising or swollen glands  Psychiatric: History of anxiety  Neurologic: Easily arousable and can communicate with full sentences generalized weakness bilaterally  Skin: No edema or rash noted       Objective   Objective     Vitals:   Temp:  [97.2 °F (36.2 °C)-98.4 °F (36.9 °C)] 97.9 °F (36.6 °C)  Heart Rate:  [66-96] 75  Resp:  [16-18] 18  BP: (129-190)/(55-84) 160/55  Flow (L/min):  [0] 0  Physical Exam:   Constitutional: Awake, alert   Eyes: PERRLA, sclerae anicteric, no conjunctival injection   HENT: NCAT, mucous membranes moist   Neck: Supple, no thyromegaly, no lymphadenopathy, trachea midline   Respiratory: Clear to auscultation bilaterally, nonlabored respirations    Cardiovascular: RRR, no murmurs, rubs, or gallops, palpable pedal pulses  bilaterally   Gastrointestinal: Positive bowel sounds, soft, nontender, nondistended   Musculoskeletal: No bilateral ankle edema, no clubbing or cyanosis to extremities   Psychiatric: Appropriate affect, cooperative   Neurologic: Oriented x 3, strength symmetric in all extremities, Cranial Nerves grossly intact to confrontation, speech clear   Skin: No rashes    Result Review    Result Review:  I have personally reviewed the results from the time of this admission to 6/18/2021 05:24 EDT and agree with these findings:  [x]  Laboratory  []  Microbiology  []  Radiology  []  EKG/Telemetry   []  Cardiology/Vascular   []  Pathology  []  Old records  []  Other:  Most notable findings include: BUN/creatinine from 6/17/2021 is 11/0.7, GFR 78 L  Hemoglobin and hematocrit 12/38  Globin and hematocrit from today morning pending      Assessment/Plan   Assessment / Plan     Brief Patient Summary:  Elsa Terry is a 85 y.o. female who  possible placement into [facility] and rehab facility  Repeat hemoglobinand hematocrit result from today pending    Active Hospital Problems:  Active Hospital Problems    Diagnosis    • **COPD with hypoxia (CMS/HCC)    • Iron deficiency anemia, unspecified    • Severe malnutrition (CMS/HCC)    • Mass of upper lobe of right lung    • Abnormal pleural fluid      Small right pleural fluid     • Nicotine dependence    • Impaired attitude towards nutritional status    • Pneumonia due to infectious organism    • HTN (hypertension)      Plan: Possible lower GI bleed fecal Hemoccult positive with   Follow-up hemoglobin and hematocrit from today pending  Request discontinued aspirin to be started    COPD with hypoxia (CMS/HCC): Continue duo nebs Brovana and Pulmicort  On room air oxygen saturation 95%     Mass of upper lobe of right lung: consider diagnostic procedure when stable                 Abnormal pleural fluid: small right pleural fluid will tap when symptomatic currently no  active intervention at this time                 Nicotine dependence: Continue Habitrol 21 mg patch daily                 Impaired attitude towards nutritional status: Encourage nutrition continue Marinol orally          Pneumonia due to infectious organism: Continue IV Zosyn to auto stop     HTN (hypertension): Continue losartan    Discontinue oxygen, discontinue BiPAP, discontinue oxygen monitoring before discharge    Okay for disposition home from pulmonary point of view    Follow-up at my office in 2 weeks post discharge from [facility] and rehab [facility]       DVT prophylaxis:  Medical DVT prophylaxis orders are present.    CODE STATUS:   Level Of Support Discussed With: Patient  Code Status: CPR  Medical Interventions (Level of Support Prior to Arrest): Full    Disposition: Per Primary MD.    Electronically signed by Jonathan Oliveira MD, 06/18/21, 5:24 AM EDT.

## 2021-07-13 ENCOUNTER — TRANSCRIBE ORDERS (OUTPATIENT)
Dept: ADMINISTRATIVE | Facility: HOSPITAL | Age: 86
End: 2021-07-13

## 2021-07-13 ENCOUNTER — HOSPITAL ENCOUNTER (OUTPATIENT)
Dept: GENERAL RADIOLOGY | Facility: HOSPITAL | Age: 86
Discharge: HOME | End: 2021-07-13
Admitting: INTERNAL MEDICINE

## 2021-07-13 DIAGNOSIS — R91.1 LUNG NODULE: Primary | ICD-10-CM

## 2021-07-13 DIAGNOSIS — R91.1 LUNG NODULE: ICD-10-CM

## 2021-07-13 PROCEDURE — 71046 X-RAY EXAM CHEST 2 VIEWS: CPT

## 2021-08-14 ENCOUNTER — LAB REQUISITION (OUTPATIENT)
Dept: LAB | Facility: HOSPITAL | Age: 86
End: 2021-08-14

## 2021-08-14 DIAGNOSIS — N39.0 URINARY TRACT INFECTION, SITE NOT SPECIFIED: ICD-10-CM

## 2021-08-14 LAB
BACTERIA UR QL AUTO: ABNORMAL /HPF
BILIRUB UR QL STRIP: NEGATIVE
CLARITY UR: CLEAR
COLOR UR: YELLOW
GLUCOSE UR STRIP-MCNC: NEGATIVE MG/DL
HGB UR QL STRIP.AUTO: NEGATIVE
KETONES UR QL STRIP: NEGATIVE
LEUKOCYTE ESTERASE UR QL STRIP.AUTO: ABNORMAL
NITRITE UR QL STRIP: NEGATIVE
PH UR STRIP.AUTO: 5.5 [PH] (ref 5–8)
PROT UR QL STRIP: NEGATIVE
RBC # UR: ABNORMAL /HPF
REF LAB TEST METHOD: ABNORMAL
SP GR UR STRIP: 1.02 (ref 1–1.03)
SQUAMOUS #/AREA URNS HPF: ABNORMAL /HPF
UROBILINOGEN UR QL STRIP: ABNORMAL
WBC UR QL AUTO: ABNORMAL /HPF

## 2021-08-14 PROCEDURE — 81001 URINALYSIS AUTO W/SCOPE: CPT | Performed by: INTERNAL MEDICINE

## 2021-08-14 PROCEDURE — 87086 URINE CULTURE/COLONY COUNT: CPT | Performed by: INTERNAL MEDICINE

## 2021-08-15 LAB — BACTERIA SPEC AEROBE CULT: NORMAL
